# Patient Record
Sex: FEMALE | Race: WHITE | Employment: OTHER | URBAN - METROPOLITAN AREA
[De-identification: names, ages, dates, MRNs, and addresses within clinical notes are randomized per-mention and may not be internally consistent; named-entity substitution may affect disease eponyms.]

---

## 2017-01-05 ENCOUNTER — OFFICE VISIT (OUTPATIENT)
Dept: INTERNAL MEDICINE CLINIC | Facility: CLINIC | Age: 52
End: 2017-01-05

## 2017-01-05 ENCOUNTER — HOSPITAL ENCOUNTER (OUTPATIENT)
Dept: GENERAL RADIOLOGY | Facility: HOSPITAL | Age: 52
Discharge: HOME | End: 2017-01-05
Attending: INTERNAL MEDICINE
Payer: MEDICAID

## 2017-01-05 VITALS
SYSTOLIC BLOOD PRESSURE: 138 MMHG | BODY MASS INDEX: 32.65 KG/M2 | HEART RATE: 76 BPM | WEIGHT: 196 LBS | RESPIRATION RATE: 16 BRPM | DIASTOLIC BLOOD PRESSURE: 83 MMHG | HEIGHT: 65 IN

## 2017-01-05 DIAGNOSIS — M54.50 ACUTE MIDLINE LOW BACK PAIN WITHOUT SCIATICA: ICD-10-CM

## 2017-01-05 DIAGNOSIS — M54.50 ACUTE MIDLINE LOW BACK PAIN WITHOUT SCIATICA: Primary | ICD-10-CM

## 2017-01-05 PROCEDURE — 72110 X-RAY EXAM L-2 SPINE 4/>VWS: CPT

## 2017-01-05 PROCEDURE — 99214 OFFICE O/P EST MOD 30 MIN: CPT | Performed by: INTERNAL MEDICINE

## 2017-01-05 PROCEDURE — 99212 OFFICE O/P EST SF 10 MIN: CPT | Performed by: INTERNAL MEDICINE

## 2017-01-05 RX ORDER — TRAMADOL HYDROCHLORIDE 50 MG/1
50 TABLET ORAL EVERY 6 HOURS PRN
Qty: 30 TABLET | Refills: 1 | Status: SHIPPED | OUTPATIENT
Start: 2017-01-05 | End: 2018-08-30

## 2017-01-05 NOTE — PROGRESS NOTES
Laurel Padilla is a 46year old female. HPI:   1. Acute midline low back pain without sciatica    Hurt back on November 22 after she fell on cement returning home.  Pain was intense and she bruised the following days with difficulty getting up and otherwise  SKIN: denies any unusual skin lesions or rashes  RESPIRATORY: denies shortness of breath with exertion  CARDIOVASCULAR: denies chest pain on exertion  GI: denies abdominal pain and denies heartburn  NEURO: denies headaches    EXAM:   /83 m to stop smoking. The patient indicates understanding of these issues and agrees to the plan.     The patient is asked to return in 3 months

## 2017-01-06 ENCOUNTER — CHARTING TRANS (OUTPATIENT)
Dept: OTHER | Age: 52
End: 2017-01-06

## 2017-01-16 ENCOUNTER — OFFICE VISIT (OUTPATIENT)
Dept: INTERNAL MEDICINE CLINIC | Facility: CLINIC | Age: 52
End: 2017-01-16

## 2017-01-16 VITALS
SYSTOLIC BLOOD PRESSURE: 134 MMHG | HEART RATE: 64 BPM | BODY MASS INDEX: 33.15 KG/M2 | WEIGHT: 199 LBS | DIASTOLIC BLOOD PRESSURE: 85 MMHG | HEIGHT: 65 IN | RESPIRATION RATE: 16 BRPM

## 2017-01-16 DIAGNOSIS — M54.50 ACUTE MIDLINE LOW BACK PAIN WITHOUT SCIATICA: ICD-10-CM

## 2017-01-16 DIAGNOSIS — F17.200 SMOKING ADDICTION: ICD-10-CM

## 2017-01-16 DIAGNOSIS — I70.0 ATHEROSCLEROSIS OF AORTA (HCC): Primary | ICD-10-CM

## 2017-01-16 DIAGNOSIS — R74.8 ELEVATED LIVER ENZYMES: ICD-10-CM

## 2017-01-16 PROCEDURE — 99214 OFFICE O/P EST MOD 30 MIN: CPT | Performed by: INTERNAL MEDICINE

## 2017-01-16 PROCEDURE — 99212 OFFICE O/P EST SF 10 MIN: CPT | Performed by: INTERNAL MEDICINE

## 2017-01-16 NOTE — PROGRESS NOTES
Saige Damon is a 46year old female. HPI:   1. Atherosclerosis of aorta (HCC)    Has atherosclerosis of aorta seen on LS spine films. Has been on cholesterol pills but LDL still over 130 range.      2. Acute midline low back pain without sciati carpal Abuttment\"   • Smoking addiction    • Lipid screening 10-     per [employer]      Social History:    Smoking Status: Smoker, Current Status Unknown  Packs/Day: 0.00  Years: 20        Types: Cigarettes    Alcohol Use: No                 REVIEW O increases ALL cancers and shortens lifespan. Told patient that various medicines are available that can be prescribed to help them stop smoking including nicotine patches, zyban, and chantix.  Electronic cigarettes can help decrease nicotine intake and make

## 2017-02-24 RX ORDER — ATORVASTATIN CALCIUM 80 MG/1
TABLET, FILM COATED ORAL
Qty: 30 TABLET | Refills: 0 | Status: SHIPPED | OUTPATIENT
Start: 2017-02-24 | End: 2017-03-22

## 2017-02-27 ENCOUNTER — TELEPHONE (OUTPATIENT)
Dept: INTERNAL MEDICINE CLINIC | Facility: CLINIC | Age: 52
End: 2017-02-27

## 2017-03-09 RX ORDER — DICLOFENAC SODIUM 75 MG/1
TABLET, DELAYED RELEASE ORAL
Qty: 180 TABLET | Refills: 0 | Status: SHIPPED | OUTPATIENT
Start: 2017-03-09 | End: 2017-06-04

## 2017-03-09 NOTE — TELEPHONE ENCOUNTER
Refill Protocol Appointment Criteria  · Appointment scheduled in the past 6 months or in the next 3 months  Recent Visits       Provider Department Primary Dx    1 month ago Maryse Hua MD [facility], [address] [place] [facility] of

## 2017-03-09 NOTE — TELEPHONE ENCOUNTER
Message was left on voice mail that she can have the pneumonia 23 vaccine and then she can have the prenvair 13. NAVI shipman assist pt with scheduling a nurse visit for the pneumonia vaccine. Thank you.

## 2017-03-14 ENCOUNTER — NURSE ONLY (OUTPATIENT)
Dept: INTERNAL MEDICINE CLINIC | Facility: CLINIC | Age: 52
End: 2017-03-14

## 2017-03-14 DIAGNOSIS — Z23 IMMUNIZATION DUE: Primary | ICD-10-CM

## 2017-03-14 PROCEDURE — 90732 PPSV23 VACC 2 YRS+ SUBQ/IM: CPT | Performed by: INTERNAL MEDICINE

## 2017-03-14 PROCEDURE — 90471 IMMUNIZATION ADMIN: CPT | Performed by: INTERNAL MEDICINE

## 2017-03-14 NOTE — PROGRESS NOTES
Here for pneum. 23 vaccine name and date of birth confirmed. Vaccine given in left deltoid patient tolerated it well.

## 2017-03-24 RX ORDER — ATORVASTATIN CALCIUM 80 MG/1
TABLET, FILM COATED ORAL
Qty: 30 TABLET | Refills: 0 | Status: SHIPPED | OUTPATIENT
Start: 2017-03-24 | End: 2017-04-23

## 2017-04-24 RX ORDER — ATORVASTATIN CALCIUM 80 MG/1
TABLET, FILM COATED ORAL
Qty: 30 TABLET | Refills: 0 | Status: SHIPPED | OUTPATIENT
Start: 2017-04-24 | End: 2017-05-22

## 2017-05-05 ENCOUNTER — TELEPHONE (OUTPATIENT)
Dept: INTERNAL MEDICINE CLINIC | Facility: CLINIC | Age: 52
End: 2017-05-05

## 2017-05-05 NOTE — TELEPHONE ENCOUNTER
Arnaldo calling for refill request on med below     gabapentin 400 MG Oral Cap 360 capsule 0 12/9/2016       Sig :  TAKE ONE CAPSULE BY MOUTH FOUR TIMES DAILY       Route:   (none)       Comment:   **Patient requests 90 days supply**       Order #:   [id number]

## 2017-05-13 RX ORDER — GABAPENTIN 400 MG/1
CAPSULE ORAL
Qty: 360 CAPSULE | Refills: 0 | Status: SHIPPED | OUTPATIENT
Start: 2017-05-13 | End: 2018-04-10

## 2017-05-13 NOTE — TELEPHONE ENCOUNTER
Refill request for Gabapentin 400 mg four times daily, quantity of 360 tablets with 0 refills , was authorized by Dr. Catalina Vital on 05/13/17. No further action is needed.

## 2017-05-13 NOTE — TELEPHONE ENCOUNTER
Refill Protocol Appointment Criteria: Refilled per protocol    · Appointment scheduled in the past 6 months or in the next 3 months  Recent Visits       Provider Department Primary Dx    3 months ago Ziggy Clarke, Merrill Ordaz MD [facility], [facility], [address] [facility]

## 2017-05-17 RX ORDER — GABAPENTIN 400 MG/1
CAPSULE ORAL
Qty: 360 CAPSULE | Refills: 0 | OUTPATIENT
Start: 2017-05-17

## 2017-05-22 NOTE — TELEPHONE ENCOUNTER
Pt is calling to follow up on refill request  Pt states [place] is leaving to [place], and needs 90 day supply please. Please advise.

## 2017-05-24 RX ORDER — ATORVASTATIN CALCIUM 80 MG/1
TABLET, FILM COATED ORAL
Qty: 30 TABLET | Refills: 0 | Status: SHIPPED | OUTPATIENT
Start: 2017-05-24 | End: 2017-10-26

## 2017-06-04 RX ORDER — DICLOFENAC SODIUM 75 MG/1
TABLET, DELAYED RELEASE ORAL
Qty: 180 TABLET | Refills: 0 | Status: SHIPPED | OUTPATIENT
Start: 2017-06-04 | End: 2017-09-05

## 2017-06-04 NOTE — TELEPHONE ENCOUNTER
Last refilled on 3/9/17 #180 0RF  Refilled per protocol    Refill Protocol Appointment Criteria  · Appointment scheduled in the past 6 months or in the next 3 months  Recent Visits       Provider Department Primary Dx    4 months ago Jocelyn Lynch M

## 2017-09-07 RX ORDER — DICLOFENAC SODIUM 75 MG/1
TABLET, DELAYED RELEASE ORAL
Qty: 180 TABLET | Refills: 0 | Status: SHIPPED | OUTPATIENT
Start: 2017-09-07 | End: 2017-12-12

## 2017-09-07 NOTE — TELEPHONE ENCOUNTER
Last refilled on 6-4-17 #90 0RF  Failed protocol. Please advise.   Refill Protocol Appointment Criteria  · Appointment scheduled in the past 6 months or in the next 3 months  Recent Outpatient Visits            5 months ago Immunization due    [facility]

## 2017-09-15 NOTE — TELEPHONE ENCOUNTER
Compliance? ? Last refilled 5-24-17 #30 0RF    Cholesterol Medications  Protocol Criteria:  · Appointment scheduled in the past 12 months or in the next 3 months  · ALT & LDL on file in the past 12 months  · ALT result < 80  · LDL result <130   Recent Outpa

## 2017-09-18 RX ORDER — ATORVASTATIN CALCIUM 80 MG/1
TABLET, FILM COATED ORAL
Qty: 30 TABLET | Refills: 0 | Status: SHIPPED | OUTPATIENT
Start: 2017-09-18 | End: 2017-10-24

## 2017-10-26 RX ORDER — ATORVASTATIN CALCIUM 80 MG/1
TABLET, FILM COATED ORAL
Qty: 30 TABLET | Refills: 0 | Status: SHIPPED | OUTPATIENT
Start: 2017-10-26 | End: 2017-11-28

## 2017-11-29 RX ORDER — ATORVASTATIN CALCIUM 80 MG/1
TABLET, FILM COATED ORAL
Qty: 30 TABLET | Refills: 0 | Status: SHIPPED | OUTPATIENT
Start: 2017-11-29 | End: 2017-12-30

## 2017-12-08 ENCOUNTER — CHARTING TRANS (OUTPATIENT)
Dept: OTHER | Age: 52
End: 2017-12-08

## 2017-12-12 RX ORDER — DICLOFENAC SODIUM 75 MG/1
TABLET, DELAYED RELEASE ORAL
Qty: 180 TABLET | Refills: 0 | Status: SHIPPED | OUTPATIENT
Start: 2017-12-12 | End: 2018-02-15

## 2017-12-12 NOTE — TELEPHONE ENCOUNTER
Protocol failed, please advise on prescription request    Refill Protocol Appointment Criteria  · Appointment scheduled in the past 6 months or in the next 3 months  Recent Outpatient Visits            9 months ago Immunization due    [facility]

## 2018-01-03 RX ORDER — ATORVASTATIN CALCIUM 80 MG/1
TABLET, FILM COATED ORAL
Qty: 30 TABLET | Refills: 0 | Status: SHIPPED | OUTPATIENT
Start: 2018-01-03 | End: 2018-03-12

## 2018-02-13 ENCOUNTER — APPOINTMENT (OUTPATIENT)
Dept: GENERAL RADIOLOGY | Facility: HOSPITAL | Age: 53
End: 2018-02-13
Attending: EMERGENCY MEDICINE

## 2018-02-13 ENCOUNTER — HOSPITAL ENCOUNTER (EMERGENCY)
Facility: HOSPITAL | Age: 53
Discharge: HOME | End: 2018-02-13
Attending: EMERGENCY MEDICINE

## 2018-02-13 VITALS
WEIGHT: 200 LBS | DIASTOLIC BLOOD PRESSURE: 53 MMHG | HEART RATE: 56 BPM | OXYGEN SATURATION: 97 % | TEMPERATURE: 98 F | BODY MASS INDEX: 33.32 KG/M2 | RESPIRATION RATE: 20 BRPM | SYSTOLIC BLOOD PRESSURE: 119 MMHG | HEIGHT: 65 IN

## 2018-02-13 DIAGNOSIS — M25.562 ACUTE PAIN OF LEFT KNEE: Primary | ICD-10-CM

## 2018-02-13 DIAGNOSIS — T24.222A PARTIAL THICKNESS BURN OF LEFT KNEE, INITIAL ENCOUNTER: ICD-10-CM

## 2018-02-13 PROCEDURE — 99283 EMERGENCY DEPT VISIT LOW MDM: CPT

## 2018-02-13 PROCEDURE — 73560 X-RAY EXAM OF KNEE 1 OR 2: CPT | Performed by: EMERGENCY MEDICINE

## 2018-02-13 RX ORDER — IBUPROFEN 600 MG/1
600 TABLET ORAL EVERY 8 HOURS PRN
Qty: 20 TABLET | Refills: 0 | Status: SHIPPED | OUTPATIENT
Start: 2018-02-13 | End: 2018-02-22

## 2018-02-13 RX ORDER — HYDROCODONE BITARTRATE AND ACETAMINOPHEN 5; 325 MG/1; MG/1
1 TABLET ORAL EVERY 4 HOURS PRN
Qty: 15 TABLET | Refills: 0 | Status: SHIPPED | OUTPATIENT
Start: 2018-02-13 | End: 2018-02-22

## 2018-02-13 RX ORDER — HYDROCODONE BITARTRATE AND ACETAMINOPHEN 5; 325 MG/1; MG/1
1 TABLET ORAL ONCE
Status: COMPLETED | OUTPATIENT
Start: 2018-02-13 | End: 2018-02-13

## 2018-02-13 NOTE — ED PROVIDER NOTES
Patient Seen in: [facility] AND [facility] Emergency Department    History   Patient presents with:  Knee Pain    Stated Complaint: Left Knee Pain / swelling     HPI    78-year-old female with history of hyperlipidemia, obesity, here with complaints of left knee Cardiovascular: Negative for chest pain. Gastrointestinal: Negative for abdominal pain, diarrhea, nausea and vomiting. Genitourinary: Negative for dysuria, flank pain and frequency. Musculoskeletal: Positive for arthralgias and joint swelling.  Christopher Mejias and time. 5/5 strength in b/l UEs and LEs, normal sensation in all extremities, normal finger to nose b/l, normal gait, no facial asymmetry, normal speech     Skin: Skin is warm and dry. No rash noted. She is not diaphoretic.    Psychiatric: She has a nor including fevers, redness, purulent drainage, pt expresses understanding and agrees to d/c instructions    EMERGENCY DEPARTMENT MEDICAL DECISION MAKING:  After obtaining the patient's history, performing the physical exam and reviewing the diagnostics, mul

## 2018-02-13 NOTE — ED INITIAL ASSESSMENT (HPI)
\"Sharp\" left knee pain x2 days, worsened with weight bearing. Denies any recent injury. + blisters and redness \"after icing it\".

## 2018-02-14 RX ORDER — DICLOFENAC SODIUM 75 MG/1
TABLET, DELAYED RELEASE ORAL
Qty: 180 TABLET | Refills: 0 | Status: CANCELLED | OUTPATIENT
Start: 2018-02-14

## 2018-02-15 ENCOUNTER — OFFICE VISIT (OUTPATIENT)
Dept: INTERNAL MEDICINE CLINIC | Facility: CLINIC | Age: 53
End: 2018-02-15

## 2018-02-15 VITALS
BODY MASS INDEX: 36.15 KG/M2 | HEART RATE: 76 BPM | SYSTOLIC BLOOD PRESSURE: 138 MMHG | HEIGHT: 65 IN | WEIGHT: 217 LBS | DIASTOLIC BLOOD PRESSURE: 88 MMHG | RESPIRATION RATE: 16 BRPM

## 2018-02-15 DIAGNOSIS — F32.0 MILD SINGLE CURRENT EPISODE OF MAJOR DEPRESSIVE DISORDER (HCC): ICD-10-CM

## 2018-02-15 DIAGNOSIS — E66.01 OBESITY, MORBID (HCC): ICD-10-CM

## 2018-02-15 DIAGNOSIS — T24.102S: Primary | ICD-10-CM

## 2018-02-15 DIAGNOSIS — E78.2 MIXED HYPERLIPIDEMIA: ICD-10-CM

## 2018-02-15 PROCEDURE — 99214 OFFICE O/P EST MOD 30 MIN: CPT | Performed by: INTERNAL MEDICINE

## 2018-02-15 PROCEDURE — 99212 OFFICE O/P EST SF 10 MIN: CPT | Performed by: INTERNAL MEDICINE
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
## 2018-02-16 PROBLEM — E66.01 OBESITY, MORBID (HCC): Status: ACTIVE | Noted: 2018-02-16

## 2018-02-16 PROBLEM — F32.0 MILD SINGLE CURRENT EPISODE OF MAJOR DEPRESSIVE DISORDER (HCC): Status: ACTIVE | Noted: 2018-02-16

## 2018-02-16 PROBLEM — F32.0 MILD SINGLE CURRENT EPISODE OF MAJOR DEPRESSIVE DISORDER: Status: ACTIVE | Noted: 2018-02-16

## 2018-02-16 RX ORDER — DICLOFENAC SODIUM 75 MG/1
TABLET, DELAYED RELEASE ORAL
Qty: 180 TABLET | Refills: 1 | Status: SHIPPED | OUTPATIENT
Start: 2018-02-16 | End: 2018-08-28

## 2018-02-20 ENCOUNTER — TELEPHONE (OUTPATIENT)
Dept: OTHER | Age: 53
End: 2018-02-20

## 2018-02-20 DIAGNOSIS — M25.569 ACUTE KNEE PAIN, UNSPECIFIED LATERALITY: Primary | ICD-10-CM
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
## 2018-02-20 NOTE — TELEPHONE ENCOUNTER
[address] [facility], If patient returns call please let her know that her referrl has been generated to ortho

## 2018-02-20 NOTE — TELEPHONE ENCOUNTER
Spoke with patient and relayed PVR message below--patient verbalizes understanding and agreement. Ortho # given to patient to call for appt. No further questions/concerns at this time.

## 2018-02-22 NOTE — TELEPHONE ENCOUNTER
Please advise on refill request. Ibuprofen and Norco originally prescribed by Dr Elisa Carbone.  Polyethylene glycol not on med list.    Please respond to pool: EM [facility] LPN/CMA    Refill Protocol Appointment Criteria  · Appointment scheduled in the past 6 months o

## 2018-02-23 RX ORDER — HYDROCODONE BITARTRATE AND ACETAMINOPHEN 5; 325 MG/1; MG/1
1 TABLET ORAL EVERY 4 HOURS PRN
Qty: 15 TABLET | Refills: 0 | Status: SHIPPED | OUTPATIENT
Start: 2018-02-23 | End: 2018-03-02

## 2018-02-23 RX ORDER — IBUPROFEN 600 MG/1
600 TABLET ORAL EVERY 8 HOURS PRN
Qty: 20 TABLET | Refills: 0 | Status: SHIPPED | OUTPATIENT
Start: 2018-02-23 | End: 2018-02-28

## 2018-02-23 RX ORDER — POLYETHYLENE GLYCOL 3350
GRANULES (GRAM) MISCELLANEOUS
Qty: 1 BOTTLE | Refills: 1 | Status: SHIPPED | OUTPATIENT
Start: 2018-02-23 | End: 2018-05-11

## 2018-02-26 ENCOUNTER — OFFICE VISIT (OUTPATIENT)
Dept: ORTHOPEDICS CLINIC | Facility: CLINIC | Age: 53
End: 2018-02-26

## 2018-02-26 VITALS — DIASTOLIC BLOOD PRESSURE: 80 MMHG | SYSTOLIC BLOOD PRESSURE: 120 MMHG | RESPIRATION RATE: 18 BRPM | HEART RATE: 60 BPM

## 2018-02-26 DIAGNOSIS — M17.12 PRIMARY OSTEOARTHRITIS OF LEFT KNEE: Primary | ICD-10-CM

## 2018-02-26 DIAGNOSIS — M22.42 CHONDROMALACIA OF LEFT PATELLA: ICD-10-CM

## 2018-02-26 PROCEDURE — 99203 OFFICE O/P NEW LOW 30 MIN: CPT | Performed by: ORTHOPAEDIC SURGERY

## 2018-02-26 PROCEDURE — 20610 DRAIN/INJ JOINT/BURSA W/O US: CPT | Performed by: ORTHOPAEDIC SURGERY

## 2018-02-26 PROCEDURE — 99212 OFFICE O/P EST SF 10 MIN: CPT | Performed by: ORTHOPAEDIC SURGERY

## 2018-02-26 RX ORDER — BETAMETHASONE SODIUM PHOSPHATE AND BETAMETHASONE ACETATE 3; 3 MG/ML; MG/ML
12 INJECTION, SUSPENSION INTRA-ARTICULAR; INTRALESIONAL; INTRAMUSCULAR; SOFT TISSUE ONCE
Status: COMPLETED | OUTPATIENT
Start: 2018-02-26 | End: 2018-02-26

## 2018-02-26 RX ADMIN — BETAMETHASONE SODIUM PHOSPHATE AND BETAMETHASONE ACETATE 12 MG: 3; 3 INJECTION, SUSPENSION INTRA-ARTICULAR; INTRALESIONAL; INTRAMUSCULAR; SOFT TISSUE at 11:06:00

## 2018-02-26 NOTE — PATIENT INSTRUCTIONS
CORTISONE INJECTIONS  WHAT IS IT? Cortisone is the strongest anti-inflammatory agent available. It is used to decrease pain and reduce localized swelling.    This is similar to taking an anti-inflammatory by mouth (Advil or aspirin) and injecting it loc

## 2018-02-26 NOTE — PROCEDURES
Procedure note-with consent given and a timeout performed the left knee was injected 2 cc Celestone 4 cc Xylocaine under sterile technique. She tolerated the injection well education material regarding injections were discussed and provided.

## 2018-02-26 NOTE — PROGRESS NOTES
Patient is a 49-year-old female who does not describe a fall or trauma but was driving and felt a cracking-like sensation in her knee and had sudden pain.   She does not describe a fall twist or turn she did have some complaints for a week or 2 before that in the legs she denies constitutional symptoms    She used to work as a  she states presently she is not working    On physical exam she is alert oriented well-groomed oriented ×3 she does not walk with an antalgic gait.   She has a height of 5 feet

## 2018-02-26 NOTE — PROGRESS NOTES
Prepared as ordered by Dr. Leatha Cherry. 4 cc's of 1 % lidocaine and 2 cc's of celestone. Pt given steroid information sheet. Pre injection vs stable. Time out  Progressed and consent signed by Marianne Huston , myself and Dr. Leatha Cherry for left knee steroid injection.  Med

## 2018-02-28 RX ORDER — IBUPROFEN 600 MG/1
TABLET ORAL
Qty: 20 TABLET | Refills: 0 | Status: SHIPPED | OUTPATIENT
Start: 2018-02-28 | End: 2018-03-04

## 2018-03-05 RX ORDER — IBUPROFEN 600 MG/1
TABLET ORAL
Qty: 20 TABLET | Refills: 0 | Status: SHIPPED | OUTPATIENT
Start: 2018-03-05 | End: 2018-03-14

## 2018-03-06 ENCOUNTER — OFFICE VISIT (OUTPATIENT)
Dept: PHYSICAL THERAPY | Facility: HOSPITAL | Age: 53
End: 2018-03-06
Attending: ORTHOPAEDIC SURGERY
Payer: MEDICAID

## 2018-03-06 DIAGNOSIS — M17.12 PRIMARY OSTEOARTHRITIS OF LEFT KNEE: ICD-10-CM

## 2018-03-06 DIAGNOSIS — M22.42 CHONDROMALACIA OF LEFT PATELLA: ICD-10-CM

## 2018-03-06 PROCEDURE — 97162 PT EVAL MOD COMPLEX 30 MIN: CPT | Performed by: PHYSICAL THERAPIST

## 2018-03-06 NOTE — PROGRESS NOTES
KNEE EVALUATION:   Referring Physician: Dr. Harriet Mg  Diagnosis: Primary osteoarthritis of left knee (M17.12)  Chondromalacia of left patella (M22.42)      Onset Date: Feb 2018 Evaluation Date: 3/6/2018  Visit # 1  Scheduled Visits 8  Insurance Authorized gait ,step too gait on stairs, decreased L-spine AROM and requires B UE support with sit to stand transfers. Jane Merino would benefit from skilled Physical Therapy for the knee and the L-spine to address the above impairments to walk and perform stairs.     P Patient will be seen for 2 x/week or a total of 10 visits over a 90 day period. Treatment will include: Manual Therapy; Therapeutic Exercises; Neuromuscular Re-education;  Therapeutic Activity; Gait Training; Patient education; Home Exercise Program; modal

## 2018-03-08 ENCOUNTER — OFFICE VISIT (OUTPATIENT)
Dept: PHYSICAL THERAPY | Facility: HOSPITAL | Age: 53
End: 2018-03-08
Attending: ORTHOPAEDIC SURGERY
Payer: MEDICAID

## 2018-03-08 DIAGNOSIS — M22.42 CHONDROMALACIA OF LEFT PATELLA: ICD-10-CM

## 2018-03-08 DIAGNOSIS — M17.12 PRIMARY OSTEOARTHRITIS OF LEFT KNEE: ICD-10-CM

## 2018-03-08 PROCEDURE — 97110 THERAPEUTIC EXERCISES: CPT | Performed by: PHYSICAL THERAPIST

## 2018-03-08 NOTE — PROGRESS NOTES
Dx: Primary osteoarthritis of left knee (M17.12)  Chondromalacia of left patella (M22.42)             Visit # 2  Fall Risk: standard     Scheduled Visits 8  Precautions: n/a   Insurance Authorized visits  10 medicaid        Next MD visit: none scheduled  E

## 2018-03-12 ENCOUNTER — OFFICE VISIT (OUTPATIENT)
Dept: PHYSICAL THERAPY | Facility: HOSPITAL | Age: 53
End: 2018-03-12
Attending: ORTHOPAEDIC SURGERY
Payer: MEDICAID

## 2018-03-12 DIAGNOSIS — M17.12 PRIMARY OSTEOARTHRITIS OF LEFT KNEE: ICD-10-CM

## 2018-03-12 DIAGNOSIS — M22.42 CHONDROMALACIA OF LEFT PATELLA: ICD-10-CM

## 2018-03-12 PROCEDURE — 97110 THERAPEUTIC EXERCISES: CPT

## 2018-03-12 PROCEDURE — 97014 ELECTRIC STIMULATION THERAPY: CPT

## 2018-03-12 RX ORDER — ATORVASTATIN CALCIUM 80 MG/1
TABLET, FILM COATED ORAL
Qty: 30 TABLET | Refills: 0 | Status: SHIPPED | OUTPATIENT
Start: 2018-03-12 | End: 2018-04-08

## 2018-03-12 NOTE — PROGRESS NOTES
Dx: Primary osteoarthritis of left knee (M17.12)  Chondromalacia of left patella (M22.42)             Visit # 3/10  Fall Risk: standard     Scheduled Visits 8  Precautions: n/a   Insurance Authorized visits  10 medicaid        Next MD visit: none scheduled

## 2018-03-14 RX ORDER — IBUPROFEN 600 MG/1
TABLET ORAL
Qty: 20 TABLET | Refills: 0 | Status: SHIPPED | OUTPATIENT
Start: 2018-03-14 | End: 2018-03-18

## 2018-03-15 ENCOUNTER — OFFICE VISIT (OUTPATIENT)
Dept: PHYSICAL THERAPY | Facility: HOSPITAL | Age: 53
End: 2018-03-15
Attending: ORTHOPAEDIC SURGERY
Payer: MEDICAID

## 2018-03-15 DIAGNOSIS — M22.42 CHONDROMALACIA OF LEFT PATELLA: ICD-10-CM

## 2018-03-15 DIAGNOSIS — M17.12 PRIMARY OSTEOARTHRITIS OF LEFT KNEE: ICD-10-CM

## 2018-03-15 PROCEDURE — 97110 THERAPEUTIC EXERCISES: CPT

## 2018-03-18 RX ORDER — IBUPROFEN 600 MG/1
TABLET ORAL
Qty: 20 TABLET | Refills: 0 | Status: SHIPPED | OUTPATIENT
Start: 2018-03-18 | End: 2018-04-08

## 2018-03-20 ENCOUNTER — OFFICE VISIT (OUTPATIENT)
Dept: PHYSICAL THERAPY | Facility: HOSPITAL | Age: 53
End: 2018-03-20
Attending: ORTHOPAEDIC SURGERY
Payer: MEDICAID

## 2018-03-20 DIAGNOSIS — M17.12 PRIMARY OSTEOARTHRITIS OF LEFT KNEE: ICD-10-CM

## 2018-03-20 DIAGNOSIS — M22.42 CHONDROMALACIA OF LEFT PATELLA: ICD-10-CM

## 2018-03-20 PROCEDURE — 97110 THERAPEUTIC EXERCISES: CPT

## 2018-03-20 PROCEDURE — 97014 ELECTRIC STIMULATION THERAPY: CPT

## 2018-03-20 NOTE — PROGRESS NOTES
Dx: Primary osteoarthritis of left knee (M17.12)  Chondromalacia of left patella (M22.42)             Visit # 4/10  Fall Risk: standard     Scheduled Visits 8  Precautions: n/a   Insurance Authorized visits  10 medicaid        Next MD visit: none scheduled

## 2018-03-22 ENCOUNTER — OFFICE VISIT (OUTPATIENT)
Dept: PHYSICAL THERAPY | Facility: HOSPITAL | Age: 53
End: 2018-03-22
Attending: ORTHOPAEDIC SURGERY
Payer: MEDICAID

## 2018-03-22 DIAGNOSIS — M22.42 CHONDROMALACIA OF LEFT PATELLA: ICD-10-CM

## 2018-03-22 DIAGNOSIS — M17.12 PRIMARY OSTEOARTHRITIS OF LEFT KNEE: ICD-10-CM

## 2018-03-22 PROCEDURE — 97014 ELECTRIC STIMULATION THERAPY: CPT

## 2018-03-22 PROCEDURE — 97110 THERAPEUTIC EXERCISES: CPT

## 2018-03-22 NOTE — PROGRESS NOTES
Dx: Primary osteoarthritis of left knee (M17.12)  Chondromalacia of left patella (M22.42)             Visit # 5/10  Fall Risk: standard     Scheduled Visits 8  Precautions: n/a   Insurance Authorized visits  10 medicaid        Next MD visit: none scheduled

## 2018-03-27 ENCOUNTER — OFFICE VISIT (OUTPATIENT)
Dept: PHYSICAL THERAPY | Facility: HOSPITAL | Age: 53
End: 2018-03-27
Attending: ORTHOPAEDIC SURGERY
Payer: MEDICAID

## 2018-03-27 DIAGNOSIS — M22.42 CHONDROMALACIA OF LEFT PATELLA: ICD-10-CM

## 2018-03-27 DIAGNOSIS — M17.12 PRIMARY OSTEOARTHRITIS OF LEFT KNEE: ICD-10-CM

## 2018-03-27 PROCEDURE — 97140 MANUAL THERAPY 1/> REGIONS: CPT

## 2018-03-27 PROCEDURE — 97110 THERAPEUTIC EXERCISES: CPT

## 2018-03-27 NOTE — PROGRESS NOTES
Dx: Primary osteoarthritis of left knee (M17.12)  Chondromalacia of left patella (M22.42)             Visit # 5/10  Fall Risk: standard     Scheduled Visits 8  Precautions: n/a   Insurance Authorized visits  10 medicaid        Next MD visit: none scheduled aggravated with ligamentous testing. Responded well to manual therapy to SIJ, TL junction, L/S with N glides decreasing pain by 1/3. HEP:  Femoral N glides, TA bracing.     Plan: Cont PT to address adverse neural tension femoral N and core stabilizati

## 2018-03-29 ENCOUNTER — OFFICE VISIT (OUTPATIENT)
Dept: PHYSICAL THERAPY | Facility: HOSPITAL | Age: 53
End: 2018-03-29
Attending: ORTHOPAEDIC SURGERY
Payer: MEDICAID

## 2018-03-29 DIAGNOSIS — M17.12 PRIMARY OSTEOARTHRITIS OF LEFT KNEE: ICD-10-CM

## 2018-03-29 DIAGNOSIS — M22.42 CHONDROMALACIA OF LEFT PATELLA: ICD-10-CM

## 2018-03-29 PROCEDURE — 97140 MANUAL THERAPY 1/> REGIONS: CPT

## 2018-03-29 PROCEDURE — 97110 THERAPEUTIC EXERCISES: CPT

## 2018-04-02 ENCOUNTER — NURSE TRIAGE (OUTPATIENT)
Dept: INTERNAL MEDICINE CLINIC | Facility: CLINIC | Age: 53
End: 2018-04-02

## 2018-04-02 ENCOUNTER — OFFICE VISIT (OUTPATIENT)
Dept: INTERNAL MEDICINE CLINIC | Facility: CLINIC | Age: 53
End: 2018-04-02

## 2018-04-02 VITALS
DIASTOLIC BLOOD PRESSURE: 78 MMHG | WEIGHT: 216 LBS | SYSTOLIC BLOOD PRESSURE: 124 MMHG | HEART RATE: 77 BPM | BODY MASS INDEX: 35.99 KG/M2 | RESPIRATION RATE: 16 BRPM | HEIGHT: 65 IN

## 2018-04-02 DIAGNOSIS — G89.29 CHRONIC PAIN OF LEFT KNEE: Primary | ICD-10-CM

## 2018-04-02 DIAGNOSIS — F32.0 MILD SINGLE CURRENT EPISODE OF MAJOR DEPRESSIVE DISORDER (HCC): ICD-10-CM

## 2018-04-02 DIAGNOSIS — M25.562 CHRONIC PAIN OF LEFT KNEE: Primary | ICD-10-CM

## 2018-04-02 DIAGNOSIS — E66.01 OBESITY, MORBID (HCC): ICD-10-CM

## 2018-04-02 PROCEDURE — 99214 OFFICE O/P EST MOD 30 MIN: CPT | Performed by: INTERNAL MEDICINE

## 2018-04-02 PROCEDURE — 99212 OFFICE O/P EST SF 10 MIN: CPT | Performed by: INTERNAL MEDICINE

## 2018-04-02 NOTE — TELEPHONE ENCOUNTER
Spoke with patient (name and  verified), reviewed information, patient verbalized understanding and agrees with plan.  appt made [phone] today [facility]

## 2018-04-02 NOTE — TELEPHONE ENCOUNTER
Action Requested: Summary for Provider     []  Critical Lab, Recommendations Needed  [] Need Additional Advice  []   FYI    []   Need Orders  [] Need Medications Sent to Pharmacy  []  Other     SUMMARY: Dr Manuel Jessica, patient reports following therapy 3 day

## 2018-04-02 NOTE — PROGRESS NOTES
Jorden Mir is a 45year old female. HPI:     1. Chronic pain of left knee    Patient developed a sore knee that was painful to walk.  Applied cold ice lance to knee and developed a burn with a fluid collection that formed and enlarged over knee (CYMBALTA) 60 MG Oral Cap DR Particles Take 60 mg by mouth daily. Disp:  Rfl:    Sertraline HCl (ZOLOFT) 25 MG Oral Tab Take 1 tablet by mouth daily.  Disp: 90 tablet Rfl: 1      Past Medical History:   Diagnosis Date   • Hyperlipidemia    • Left wrist frac ibuprofen 800 mg (4 of the 200 mg pills or capsules) every 8 hours with food or tylenol XS (500 mg) up to 4 daily. (1 every 6 hours) for pain pain relief. Glucosamine chondroitin 500/400 can be tried OTC (over the counter) twice daily as well. Use ibuprofen

## 2018-04-03 ENCOUNTER — HOSPITAL ENCOUNTER (OUTPATIENT)
Dept: ULTRASOUND IMAGING | Facility: HOSPITAL | Age: 53
Discharge: HOME | End: 2018-04-03
Attending: ORTHOPAEDIC SURGERY
Payer: MEDICARE

## 2018-04-03 ENCOUNTER — OFFICE VISIT (OUTPATIENT)
Dept: ORTHOPEDICS CLINIC | Facility: CLINIC | Age: 53
End: 2018-04-03

## 2018-04-03 ENCOUNTER — HOSPITAL ENCOUNTER (OUTPATIENT)
Dept: GENERAL RADIOLOGY | Facility: HOSPITAL | Age: 53
Discharge: HOME | End: 2018-04-03
Attending: ORTHOPAEDIC SURGERY
Payer: MEDICARE

## 2018-04-03 DIAGNOSIS — M25.462 BILATERAL KNEE SWELLING: ICD-10-CM

## 2018-04-03 DIAGNOSIS — M25.461 BILATERAL KNEE SWELLING: ICD-10-CM

## 2018-04-03 DIAGNOSIS — M79.661 RIGHT CALF PAIN: ICD-10-CM

## 2018-04-03 DIAGNOSIS — M25.562 ACUTE PAIN OF LEFT KNEE: ICD-10-CM

## 2018-04-03 DIAGNOSIS — M25.561 ACUTE PAIN OF RIGHT KNEE: Primary | ICD-10-CM

## 2018-04-03 PROCEDURE — 99212 OFFICE O/P EST SF 10 MIN: CPT | Performed by: ORTHOPAEDIC SURGERY

## 2018-04-03 PROCEDURE — 93971 EXTREMITY STUDY: CPT | Performed by: ORTHOPAEDIC SURGERY

## 2018-04-03 PROCEDURE — 73565 X-RAY EXAM OF KNEES: CPT | Performed by: ORTHOPAEDIC SURGERY

## 2018-04-03 PROCEDURE — 99214 OFFICE O/P EST MOD 30 MIN: CPT | Performed by: ORTHOPAEDIC SURGERY

## 2018-04-03 PROCEDURE — 73560 X-RAY EXAM OF KNEE 1 OR 2: CPT | Performed by: ORTHOPAEDIC SURGERY

## 2018-04-04 NOTE — PROGRESS NOTES
4/3/2018  Cathie Pino  11/19/1965  45year old   female  Amadeo Walker MD    HPI:   Patient presents with:  Knee Pain: Bilateral - was seen by Dr Laurel Oden and had a cortisone inj in the L on 2/26/18 - she is in PT and yesterday her bilatera unspecified    • Other ill-defined conditions(799.89) 2012    per [employer]:  \"Left wrist/elbow ulnocarpal disease; Management:  Ulno carpal Abuttment\"   • Right ACL tear 2011    per [employer]:  \"Right knee ACL tear\"   • Rotator cuff tear 11/2009    per Ne lines bilaterally. Patient has a positive Paola's test bilaterally. Patient has retropatellar compression pain of the bilateral knees. The patient has medial patella facet tenderness to palpation bilaterally.   Patient has an intact cruciate and colla

## 2018-04-07 NOTE — PROGRESS NOTES
Dx: Primary osteoarthritis of left knee (M17.12)  Chondromalacia of left patella (M22.42)             Visit # 6/10  Fall Risk: standard     Scheduled Visits 8  Precautions: n/a   Insurance Authorized visits  10 medicaid        Next MD visit: none scheduled

## 2018-04-08 RX ORDER — IBUPROFEN 600 MG/1
TABLET ORAL
Qty: 20 TABLET | Refills: 0 | Status: SHIPPED | OUTPATIENT
Start: 2018-04-08 | End: 2018-04-17

## 2018-04-08 RX ORDER — ATORVASTATIN CALCIUM 80 MG/1
TABLET, FILM COATED ORAL
Qty: 30 TABLET | Refills: 0 | Status: SHIPPED | OUTPATIENT
Start: 2018-04-08 | End: 2018-05-07

## 2018-04-10 ENCOUNTER — HOSPITAL ENCOUNTER (OUTPATIENT)
Dept: MRI IMAGING | Facility: HOSPITAL | Age: 53
Discharge: HOME | End: 2018-04-10
Attending: ORTHOPAEDIC SURGERY
Payer: MEDICARE

## 2018-04-10 ENCOUNTER — OFFICE VISIT (OUTPATIENT)
Dept: ORTHOPEDICS CLINIC | Facility: CLINIC | Age: 53
End: 2018-04-10

## 2018-04-10 VITALS — DIASTOLIC BLOOD PRESSURE: 72 MMHG | HEART RATE: 76 BPM | SYSTOLIC BLOOD PRESSURE: 120 MMHG | RESPIRATION RATE: 16 BRPM

## 2018-04-10 DIAGNOSIS — M17.12 PRIMARY OSTEOARTHRITIS OF LEFT KNEE: ICD-10-CM

## 2018-04-10 DIAGNOSIS — M25.561 ACUTE PAIN OF RIGHT KNEE: ICD-10-CM

## 2018-04-10 DIAGNOSIS — M25.562 ACUTE PAIN OF LEFT KNEE: ICD-10-CM

## 2018-04-10 DIAGNOSIS — M23.41 LOOSE BODY IN KNEE, RIGHT KNEE: Primary | ICD-10-CM

## 2018-04-10 PROCEDURE — 99212 OFFICE O/P EST SF 10 MIN: CPT | Performed by: ORTHOPAEDIC SURGERY

## 2018-04-10 PROCEDURE — 73721 MRI JNT OF LWR EXTRE W/O DYE: CPT | Performed by: ORTHOPAEDIC SURGERY

## 2018-04-10 PROCEDURE — 99213 OFFICE O/P EST LOW 20 MIN: CPT | Performed by: ORTHOPAEDIC SURGERY

## 2018-04-10 NOTE — PROGRESS NOTES
Per verbal order from [facility], draw up 4ml of 1% lidocaine and 2ml of Kenalog 10 for cortisone injection to right knee.  Abby Le

## 2018-04-10 NOTE — PROGRESS NOTES
4/10/2018  Salty Milan  11/19/1965  45year old   female  Nirmal Isabel MD    HPI:   Patient presents with:  Knee Pain: Bilateral f/u and MRI results - had MRI done today in the system - still has pain rated as 9/10 at all the time.      Chaka Oviedo cartilage thinning in the medial, lateral, patellofemoral compartments. There are stress changes within the medial compartment without acute fracture.     ASSESSMENT AND PLAN:   Loose body in knee, right knee  (primary encounter diagnosis)  Acute pain of r

## 2018-04-11 ENCOUNTER — APPOINTMENT (OUTPATIENT)
Dept: PHYSICAL THERAPY | Facility: HOSPITAL | Age: 53
End: 2018-04-11
Attending: PODIATRIST
Payer: MEDICAID

## 2018-04-11 RX ORDER — GABAPENTIN 400 MG/1
CAPSULE ORAL
Qty: 360 CAPSULE | Refills: 0 | Status: SHIPPED | OUTPATIENT
Start: 2018-04-11 | End: 2018-08-28

## 2018-04-17 ENCOUNTER — TELEPHONE (OUTPATIENT)
Dept: PHYSICAL THERAPY | Facility: HOSPITAL | Age: 53
End: 2018-04-17

## 2018-04-17 RX ORDER — IBUPROFEN 600 MG/1
TABLET ORAL
Qty: 20 TABLET | Refills: 0 | Status: SHIPPED | OUTPATIENT
Start: 2018-04-17 | End: 2018-04-21

## 2018-04-18 ENCOUNTER — APPOINTMENT (OUTPATIENT)
Dept: PHYSICAL THERAPY | Facility: HOSPITAL | Age: 53
End: 2018-04-18
Attending: PODIATRIST
Payer: MEDICAID

## 2018-04-23 ENCOUNTER — APPOINTMENT (OUTPATIENT)
Dept: PHYSICAL THERAPY | Facility: HOSPITAL | Age: 53
End: 2018-04-23
Attending: PODIATRIST
Payer: MEDICAID

## 2018-04-23 RX ORDER — IBUPROFEN 600 MG/1
TABLET ORAL
Qty: 20 TABLET | Refills: 0 | Status: SHIPPED | OUTPATIENT
Start: 2018-04-23 | End: 2018-04-26

## 2018-04-26 ENCOUNTER — TELEPHONE (OUTPATIENT)
Dept: INTERNAL MEDICINE CLINIC | Facility: CLINIC | Age: 53
End: 2018-04-26

## 2018-04-26 ENCOUNTER — OFFICE VISIT (OUTPATIENT)
Dept: PHYSICAL THERAPY | Facility: HOSPITAL | Age: 53
End: 2018-04-26
Attending: PODIATRIST
Payer: MEDICAID

## 2018-04-26 DIAGNOSIS — M54.50 ACUTE MIDLINE LOW BACK PAIN WITHOUT SCIATICA: Primary | ICD-10-CM

## 2018-04-26 PROCEDURE — 97110 THERAPEUTIC EXERCISES: CPT

## 2018-04-26 PROCEDURE — 97140 MANUAL THERAPY 1/> REGIONS: CPT

## 2018-04-26 RX ORDER — IBUPROFEN 600 MG/1
TABLET ORAL
Qty: 20 TABLET | Refills: 0 | Status: SHIPPED | OUTPATIENT
Start: 2018-04-26 | End: 2018-05-17

## 2018-04-26 NOTE — TELEPHONE ENCOUNTER
Benjy Perez from PT called stating Pt needs updated order for 1/16/2017 acute midline lower back pain. Please advise, thank you.

## 2018-04-26 NOTE — PROGRESS NOTES
Dx: Primary osteoarthritis of left knee (M17.12)  Chondromalacia of left patella (M22.42)             Visit # 9/10  Fall Risk: standard     Scheduled Visits   Precautions: n/a   Insurance Authorized visits  10 medicaid        Next MD visit: none scheduled

## 2018-04-26 NOTE — TELEPHONE ENCOUNTER
Pt wants to know if Dr will be willing to prescribe her a meds to help her with her arthritis pain.     Pt say med is called     [employer]

## 2018-04-30 ENCOUNTER — OFFICE VISIT (OUTPATIENT)
Dept: PHYSICAL THERAPY | Facility: HOSPITAL | Age: 53
End: 2018-04-30
Attending: PODIATRIST
Payer: MEDICAID

## 2018-04-30 PROCEDURE — 97110 THERAPEUTIC EXERCISES: CPT

## 2018-04-30 NOTE — PROGRESS NOTES
DISCHARGE SUMMARY    Dx: Primary osteoarthritis of left knee (M17.12)  Chondromalacia of left patella (M22.42)             Visit # 10/10  Fall Risk: standard     Scheduled Visits   Precautions: n/a   Insurance Authorized visits  10 medicaid        [facility] MD grocery store with <2/10 pain report (NOT MET DUE TO ELEVATED PAIN LEVELS)    Plan: DC with full HEP due to plateau in gains.     Charges: EX 3 Total Timed Treatment: EX 40 min  Total Treatment Time:42 min

## 2018-05-03 ENCOUNTER — APPOINTMENT (OUTPATIENT)
Dept: PHYSICAL THERAPY | Facility: HOSPITAL | Age: 53
End: 2018-05-03
Attending: PODIATRIST
Payer: MEDICARE

## 2018-05-08 ENCOUNTER — APPOINTMENT (OUTPATIENT)
Dept: PHYSICAL THERAPY | Facility: HOSPITAL | Age: 53
End: 2018-05-08
Attending: INTERNAL MEDICINE
Payer: MEDICARE

## 2018-05-09 RX ORDER — ATORVASTATIN CALCIUM 80 MG/1
TABLET, FILM COATED ORAL
Qty: 30 TABLET | Refills: 0 | Status: SHIPPED | OUTPATIENT
Start: 2018-05-09 | End: 2018-06-05

## 2018-05-09 NOTE — TELEPHONE ENCOUNTER
Please let patient know that she is overdue for her blood work and to please get that drawn-will refill, please have her follow-up with her PCP

## 2018-05-12 RX ORDER — POLYETHYLENE GLYCOL 3350 17 G/17G
POWDER, FOR SOLUTION ORAL
Qty: 578 G | Refills: 1 | Status: SHIPPED | OUTPATIENT
Start: 2018-05-12 | End: 2019-04-26

## 2018-05-12 NOTE — TELEPHONE ENCOUNTER
Gastrointestional Medication Protocol Passed5/11 7:45 AM   Appointment in past 12 or next 3 months     Medication refilled for 90 days per protocol.

## 2018-05-15 ENCOUNTER — APPOINTMENT (OUTPATIENT)
Dept: PHYSICAL THERAPY | Facility: HOSPITAL | Age: 53
End: 2018-05-15
Attending: INTERNAL MEDICINE
Payer: MEDICARE

## 2018-05-17 RX ORDER — IBUPROFEN 600 MG/1
TABLET ORAL
Qty: 20 TABLET | Refills: 0 | Status: SHIPPED | OUTPATIENT
Start: 2018-05-17 | End: 2018-05-22

## 2018-05-18 ENCOUNTER — APPOINTMENT (OUTPATIENT)
Dept: PHYSICAL THERAPY | Facility: HOSPITAL | Age: 53
End: 2018-05-18
Attending: INTERNAL MEDICINE
Payer: MEDICARE

## 2018-05-21 ENCOUNTER — APPOINTMENT (OUTPATIENT)
Dept: PHYSICAL THERAPY | Facility: HOSPITAL | Age: 53
End: 2018-05-21
Attending: INTERNAL MEDICINE
Payer: MEDICARE

## 2018-05-22 RX ORDER — IBUPROFEN 600 MG/1
TABLET ORAL
Qty: 20 TABLET | Refills: 0 | Status: SHIPPED | OUTPATIENT
Start: 2018-05-22 | End: 2018-05-31

## 2018-05-22 NOTE — TELEPHONE ENCOUNTER
Refill Protocol Appointment Criteria  · Appointment scheduled in the past 6 months or in the next 3 months  Recent Outpatient Visits            3 weeks ago     [facility] Juan Lama    Office Visit    3 weeks ago     T[facility]

## 2018-05-24 ENCOUNTER — APPOINTMENT (OUTPATIENT)
Dept: PHYSICAL THERAPY | Facility: HOSPITAL | Age: 53
End: 2018-05-24
Attending: INTERNAL MEDICINE
Payer: MEDICARE

## 2018-05-29 ENCOUNTER — APPOINTMENT (OUTPATIENT)
Dept: PHYSICAL THERAPY | Facility: HOSPITAL | Age: 53
End: 2018-05-29
Attending: INTERNAL MEDICINE
Payer: MEDICARE

## 2018-05-31 RX ORDER — IBUPROFEN 600 MG/1
TABLET ORAL
Qty: 257 TABLET | Refills: 0 | OUTPATIENT
Start: 2018-05-31

## 2018-05-31 RX ORDER — IBUPROFEN 600 MG/1
TABLET ORAL
Qty: 20 TABLET | Refills: 0 | Status: SHIPPED | OUTPATIENT
Start: 2018-05-31 | End: 2018-06-05

## 2018-05-31 RX ORDER — ATORVASTATIN CALCIUM 80 MG/1
TABLET, FILM COATED ORAL
Qty: 30 TABLET | Refills: 0 | OUTPATIENT
Start: 2018-05-31

## 2018-05-31 NOTE — TELEPHONE ENCOUNTER
Pharmacy     [address] AT [facility], [phone], [phone]   Medication Detail      Disp Refills Start End    IBUPROFEN 600 MG Oral Tab 20 tablet 0 5/31/2018     Sig: TAKE 1 T

## 2018-06-01 ENCOUNTER — APPOINTMENT (OUTPATIENT)
Dept: PHYSICAL THERAPY | Facility: HOSPITAL | Age: 53
End: 2018-06-01
Attending: INTERNAL MEDICINE
Payer: MEDICARE

## 2018-06-05 RX ORDER — IBUPROFEN 600 MG/1
TABLET ORAL
Qty: 20 TABLET | Refills: 0 | Status: SHIPPED | OUTPATIENT
Start: 2018-06-05 | End: 2018-06-14

## 2018-06-05 RX ORDER — ATORVASTATIN CALCIUM 80 MG/1
TABLET, FILM COATED ORAL
Qty: 30 TABLET | Refills: 5 | Status: SHIPPED | OUTPATIENT
Start: 2018-06-05 | End: 2018-12-02

## 2018-06-14 RX ORDER — IBUPROFEN 600 MG/1
TABLET ORAL
Qty: 20 TABLET | Refills: 0 | Status: SHIPPED | OUTPATIENT
Start: 2018-06-14 | End: 2018-06-17

## 2018-06-19 RX ORDER — IBUPROFEN 600 MG/1
TABLET ORAL
Qty: 20 TABLET | Refills: 0 | Status: SHIPPED | OUTPATIENT
Start: 2018-06-19 | End: 2020-03-30

## 2018-06-19 NOTE — TELEPHONE ENCOUNTER
Non-Narcotic Pain Medication Protocol Passed6/17 8:00 AM   Appointment in past 6 or next 3 months     [address] [address], [address] [facility]  [address] [facility], [phone], [phone]   Medication D

## 2018-06-24 RX ORDER — IBUPROFEN 600 MG/1
TABLET ORAL
Qty: 20 TABLET | Refills: 0 | OUTPATIENT
Start: 2018-06-24

## 2018-08-29 RX ORDER — DICLOFENAC SODIUM 75 MG/1
TABLET, DELAYED RELEASE ORAL
Qty: 180 TABLET | Refills: 0 | Status: SHIPPED | OUTPATIENT
Start: 2018-08-29 | End: 2018-11-25

## 2018-08-29 RX ORDER — GABAPENTIN 400 MG/1
CAPSULE ORAL
Qty: 360 CAPSULE | Refills: 0 | Status: SHIPPED | OUTPATIENT
Start: 2018-08-29 | End: 2018-11-25

## 2018-08-30 ENCOUNTER — OFFICE VISIT (OUTPATIENT)
Dept: INTERNAL MEDICINE CLINIC | Facility: CLINIC | Age: 53
End: 2018-08-30
Payer: MEDICAID

## 2018-08-30 ENCOUNTER — LAB ENCOUNTER (OUTPATIENT)
Dept: LAB | Facility: HOSPITAL | Age: 53
End: 2018-08-30
Attending: INTERNAL MEDICINE
Payer: MEDICARE

## 2018-08-30 VITALS
SYSTOLIC BLOOD PRESSURE: 144 MMHG | BODY MASS INDEX: 34 KG/M2 | WEIGHT: 203 LBS | DIASTOLIC BLOOD PRESSURE: 84 MMHG | RESPIRATION RATE: 16 BRPM | HEART RATE: 76 BPM

## 2018-08-30 DIAGNOSIS — E66.01 OBESITY, MORBID (HCC): ICD-10-CM

## 2018-08-30 DIAGNOSIS — E55.9 VITAMIN D DEFICIENCY: ICD-10-CM

## 2018-08-30 DIAGNOSIS — G89.29 CHRONIC PAIN OF LEFT KNEE: Primary | ICD-10-CM

## 2018-08-30 DIAGNOSIS — F32.0 MILD SINGLE CURRENT EPISODE OF MAJOR DEPRESSIVE DISORDER (HCC): ICD-10-CM

## 2018-08-30 DIAGNOSIS — M25.562 CHRONIC PAIN OF LEFT KNEE: Primary | ICD-10-CM

## 2018-08-30 LAB
ALBUMIN SERPL BCP-MCNC: 4.3 G/DL (ref 3.5–4.8)
ALBUMIN/GLOB SERPL: 1.4 {RATIO} (ref 1–2)
ALP SERPL-CCNC: 108 U/L (ref 32–100)
ALT SERPL-CCNC: 37 U/L (ref 14–54)
ANION GAP SERPL CALC-SCNC: 8 MMOL/L (ref 0–18)
AST SERPL-CCNC: 30 U/L (ref 15–41)
BACTERIA UR QL AUTO: NEGATIVE /HPF
BASOPHILS # BLD: 0 K/UL (ref 0–0.2)
BASOPHILS NFR BLD: 0 %
BILIRUB SERPL-MCNC: 0.8 MG/DL (ref 0.3–1.2)
BILIRUB UR QL: NEGATIVE
BUN SERPL-MCNC: 8 MG/DL (ref 8–20)
BUN/CREAT SERPL: 10.4 (ref 10–20)
CALCIUM SERPL-MCNC: 9.6 MG/DL (ref 8.5–10.5)
CHLORIDE SERPL-SCNC: 107 MMOL/L (ref 95–110)
CHOLEST SERPL-MCNC: 232 MG/DL (ref 110–200)
CLARITY UR: CLEAR
CO2 SERPL-SCNC: 27 MMOL/L (ref 22–32)
COLOR UR: YELLOW
CREAT SERPL-MCNC: 0.77 MG/DL (ref 0.5–1.5)
EOSINOPHIL # BLD: 0.1 K/UL (ref 0–0.7)
EOSINOPHIL NFR BLD: 1 %
ERYTHROCYTE [DISTWIDTH] IN BLOOD BY AUTOMATED COUNT: 14.7 % (ref 11–15)
GLOBULIN PLAS-MCNC: 3 G/DL (ref 2.5–3.7)
GLUCOSE SERPL-MCNC: 94 MG/DL (ref 70–99)
GLUCOSE UR-MCNC: NEGATIVE MG/DL
HCT VFR BLD AUTO: 47.8 % (ref 35–48)
HDLC SERPL-MCNC: 32 MG/DL
HGB BLD-MCNC: 16.3 G/DL (ref 12–16)
HGB UR QL STRIP.AUTO: NEGATIVE
KETONES UR-MCNC: NEGATIVE MG/DL
LDLC SERPL CALC-MCNC: 176 MG/DL (ref 0–99)
LYMPHOCYTES # BLD: 2.6 K/UL (ref 1–4)
LYMPHOCYTES NFR BLD: 24 %
MCH RBC QN AUTO: 30.3 PG (ref 27–32)
MCHC RBC AUTO-ENTMCNC: 34.1 G/DL (ref 32–37)
MCV RBC AUTO: 89 FL (ref 80–100)
MONOCYTES # BLD: 1 K/UL (ref 0–1)
MONOCYTES NFR BLD: 9 %
NEUTROPHILS # BLD AUTO: 6.8 K/UL (ref 1.8–7.7)
NEUTROPHILS NFR BLD: 65 %
NITRITE UR QL STRIP.AUTO: NEGATIVE
NONHDLC SERPL-MCNC: 200 MG/DL
OSMOLALITY UR CALC.SUM OF ELEC: 292 MOSM/KG (ref 275–295)
PATIENT FASTING: YES
PH UR: 6 [PH] (ref 5–8)
PLATELET # BLD AUTO: 232 K/UL (ref 140–400)
PMV BLD AUTO: 8.9 FL (ref 7.4–10.3)
POTASSIUM SERPL-SCNC: 4.1 MMOL/L (ref 3.3–5.1)
PROT SERPL-MCNC: 7.3 G/DL (ref 5.9–8.4)
PROT UR-MCNC: NEGATIVE MG/DL
RBC # BLD AUTO: 5.38 M/UL (ref 3.7–5.4)
RBC #/AREA URNS AUTO: 2 /HPF
SODIUM SERPL-SCNC: 142 MMOL/L (ref 136–144)
SP GR UR STRIP: 1.01 (ref 1–1.03)
TRIGL SERPL-MCNC: 122 MG/DL (ref 1–149)
TSH SERPL-ACNC: 1.51 UIU/ML (ref 0.45–5.33)
UROBILINOGEN UR STRIP-ACNC: <2
VIT C UR-MCNC: NEGATIVE MG/DL
WBC # BLD AUTO: 10.5 K/UL (ref 4–11)
WBC #/AREA URNS AUTO: 11 /HPF

## 2018-08-30 PROCEDURE — 99212 OFFICE O/P EST SF 10 MIN: CPT | Performed by: INTERNAL MEDICINE

## 2018-08-30 PROCEDURE — 80061 LIPID PANEL: CPT

## 2018-08-30 PROCEDURE — 36415 COLL VENOUS BLD VENIPUNCTURE: CPT

## 2018-08-30 PROCEDURE — 82306 VITAMIN D 25 HYDROXY: CPT | Performed by: INTERNAL MEDICINE

## 2018-08-30 PROCEDURE — 81001 URINALYSIS AUTO W/SCOPE: CPT

## 2018-08-30 PROCEDURE — 85025 COMPLETE CBC W/AUTO DIFF WBC: CPT

## 2018-08-30 PROCEDURE — 99214 OFFICE O/P EST MOD 30 MIN: CPT | Performed by: INTERNAL MEDICINE

## 2018-08-30 PROCEDURE — 80053 COMPREHEN METABOLIC PANEL: CPT

## 2018-08-30 PROCEDURE — 84443 ASSAY THYROID STIM HORMONE: CPT

## 2018-08-30 RX ORDER — TRAMADOL HYDROCHLORIDE 50 MG/1
50 TABLET ORAL EVERY 6 HOURS PRN
Qty: 30 TABLET | Refills: 0 | Status: SHIPPED | OUTPATIENT
Start: 2018-08-30 | End: 2019-09-23

## 2018-08-30 NOTE — PROGRESS NOTES
Laurel Padilla is a 45year old female. HPI:     1. Chronic pain of left knee    Patient developed a sore knee that was painful to walk.  Applied cold ice lance to knee and developed a burn with a fluid collection that formed and enlarged over knee DULoxetine HCl (CYMBALTA) 60 MG Oral Cap DR Particles Take 60 mg by mouth daily. Disp:  Rfl:    Sertraline HCl (ZOLOFT) 25 MG Oral Tab Take 1 tablet by mouth daily.  Disp: 90 tablet Rfl: 1      Past Medical History:   Diagnosis Date   • Hyperlipidemia food or tylenol XS (500 mg) up to 4 daily. (1 every 6 hours) for pain pain relief. Glucosamine chondroitin 500/400 can be tried OTC (over the counter) twice daily as well. Use ibuprofen 600 TID with food to help arthritis pain.  See orthopedics if pain persi

## 2018-08-31 LAB — 25(OH)D3 SERPL-MCNC: 44.9 NG/ML

## 2018-09-04 NOTE — TELEPHONE ENCOUNTER
No Protocol on this med. Pending Prescriptions Disp Refills    IBUPROFEN 600 MG Oral Tab [Pharmacy Med Name: IBUPROFEN 600MG TABLETS] 20 tablet 0     Sig: TAKE 1 TABLET BY MOUTH EVERY 8 HOURS AS NEEDED           LOV 8-30-18  LORELEI 6-19-18 # 20    pls advise, thanks in advance.

## 2018-09-05 RX ORDER — IBUPROFEN 600 MG/1
TABLET ORAL
Qty: 20 TABLET | Refills: 0 | Status: SHIPPED | OUTPATIENT
Start: 2018-09-05 | End: 2018-11-26

## 2018-09-11 RX ORDER — IBUPROFEN 600 MG/1
TABLET ORAL
Qty: 20 TABLET | Refills: 0 | OUTPATIENT
Start: 2018-09-11

## 2018-11-06 VITALS — TEMPERATURE: 98.4 F

## 2018-11-25 RX ORDER — DICLOFENAC SODIUM 75 MG/1
TABLET, DELAYED RELEASE ORAL
Qty: 180 TABLET | Refills: 0 | Status: SHIPPED | OUTPATIENT
Start: 2018-11-25 | End: 2019-02-19

## 2018-11-25 RX ORDER — GABAPENTIN 400 MG/1
CAPSULE ORAL
Qty: 360 CAPSULE | Refills: 0 | Status: SHIPPED | OUTPATIENT
Start: 2018-11-25 | End: 2019-02-19

## 2018-11-26 RX ORDER — IBUPROFEN 600 MG/1
TABLET ORAL
Qty: 20 TABLET | Refills: 0 | Status: SHIPPED | OUTPATIENT
Start: 2018-11-26 | End: 2018-11-28

## 2018-11-28 RX ORDER — IBUPROFEN 600 MG/1
TABLET ORAL
Qty: 300 TABLET | Refills: 0 | Status: SHIPPED | OUTPATIENT
Start: 2018-11-28 | End: 2019-03-21

## 2018-12-03 RX ORDER — ATORVASTATIN CALCIUM 80 MG/1
TABLET, FILM COATED ORAL
Qty: 90 TABLET | Refills: 1 | Status: SHIPPED | OUTPATIENT
Start: 2018-12-03 | End: 2019-05-19

## 2019-02-19 RX ORDER — DICLOFENAC SODIUM 75 MG/1
TABLET, DELAYED RELEASE ORAL
Qty: 180 TABLET | Refills: 0 | Status: SHIPPED | OUTPATIENT
Start: 2019-02-19 | End: 2019-04-26

## 2019-02-19 RX ORDER — GABAPENTIN 400 MG/1
CAPSULE ORAL
Qty: 360 CAPSULE | Refills: 0 | Status: SHIPPED | OUTPATIENT
Start: 2019-02-19 | End: 2019-04-26

## 2019-03-23 NOTE — TELEPHONE ENCOUNTER
Please advise in regards to refill request. Thank You  Refill Protocol Appointment Criteria  · Appointment scheduled in the past 6 months or in the next 3 months  Recent Outpatient Visits            6 months ago Chronic pain of left knee    [address]

## 2019-03-24 RX ORDER — IBUPROFEN 600 MG/1
TABLET ORAL
Qty: 300 TABLET | Refills: 0 | Status: SHIPPED | OUTPATIENT
Start: 2019-03-24 | End: 2019-09-04

## 2019-04-26 RX ORDER — POLYETHYLENE GLYCOL 3350 17 G/17G
POWDER, FOR SOLUTION ORAL
Qty: 527 G | Refills: 0 | Status: SHIPPED | OUTPATIENT
Start: 2019-04-26

## 2019-04-27 NOTE — TELEPHONE ENCOUNTER
Refill passed per [facility] protocol.   Refill Protocol Appointment Criteria  · Appointment scheduled in the past 12 months or in the next 3 months  Recent Outpatient Visits            7 months ago Chronic pain of left knee    [facility], [facility], [address]

## 2019-04-29 RX ORDER — GABAPENTIN 400 MG/1
CAPSULE ORAL
Qty: 360 CAPSULE | Refills: 0 | Status: SHIPPED | OUTPATIENT
Start: 2019-04-29 | End: 2019-07-29

## 2019-04-29 RX ORDER — DICLOFENAC SODIUM 75 MG/1
TABLET, DELAYED RELEASE ORAL
Qty: 180 TABLET | Refills: 0 | Status: SHIPPED | OUTPATIENT
Start: 2019-04-29 | End: 2019-07-29

## 2019-05-20 RX ORDER — ATORVASTATIN CALCIUM 80 MG/1
TABLET, FILM COATED ORAL
Qty: 90 TABLET | Refills: 1 | Status: SHIPPED | OUTPATIENT
Start: 2019-05-20 | End: 2019-11-18

## 2019-07-30 NOTE — TELEPHONE ENCOUNTER
Please review; protocol failed.   LOV-8/30/18    Requested Prescriptions   Pending Prescriptions Disp Refills   • DICLOFENAC SODIUM 75 MG Oral Tab EC [Pharmacy Med Name: DICLOFENAC SODIUM 75MG DR TABLETS] 180 tablet 0     Sig: TAKE 1 TABLET BY MOUTH TWICE D

## 2019-07-31 RX ORDER — DICLOFENAC SODIUM 75 MG/1
TABLET, DELAYED RELEASE ORAL
Qty: 180 TABLET | Refills: 0 | Status: SHIPPED | OUTPATIENT
Start: 2019-07-31 | End: 2019-09-23

## 2019-07-31 RX ORDER — GABAPENTIN 400 MG/1
CAPSULE ORAL
Qty: 360 CAPSULE | Refills: 0 | Status: SHIPPED | OUTPATIENT
Start: 2019-07-31 | End: 2020-03-30

## 2019-08-26 ENCOUNTER — HOSPITAL ENCOUNTER (EMERGENCY)
Facility: HOSPITAL | Age: 54
Discharge: HOME | End: 2019-08-26
Attending: EMERGENCY MEDICINE
Payer: MEDICARE

## 2019-08-26 VITALS
BODY MASS INDEX: 31.65 KG/M2 | HEART RATE: 78 BPM | OXYGEN SATURATION: 98 % | WEIGHT: 190 LBS | SYSTOLIC BLOOD PRESSURE: 124 MMHG | HEIGHT: 65 IN | DIASTOLIC BLOOD PRESSURE: 67 MMHG | RESPIRATION RATE: 18 BRPM | TEMPERATURE: 98 F

## 2019-08-26 DIAGNOSIS — M54.17 LUMBOSACRAL RADICULOPATHY: Primary | ICD-10-CM

## 2019-08-26 PROCEDURE — 99284 EMERGENCY DEPT VISIT MOD MDM: CPT

## 2019-08-26 PROCEDURE — 96372 THER/PROPH/DIAG INJ SC/IM: CPT

## 2019-08-26 RX ORDER — METHYLPREDNISOLONE 4 MG/1
TABLET ORAL
Qty: 1 PACKAGE | Refills: 0 | Status: SHIPPED | OUTPATIENT
Start: 2019-08-26 | End: 2019-09-09 | Stop reason: ALTCHOICE

## 2019-08-26 RX ORDER — MORPHINE SULFATE 4 MG/ML
4 INJECTION, SOLUTION INTRAMUSCULAR; INTRAVENOUS ONCE
Status: COMPLETED | OUTPATIENT
Start: 2019-08-26 | End: 2019-08-26

## 2019-08-26 RX ORDER — CYCLOBENZAPRINE HCL 10 MG
10 TABLET ORAL 3 TIMES DAILY PRN
Qty: 30 TABLET | Refills: 0 | Status: SHIPPED | OUTPATIENT
Start: 2019-08-26 | End: 2019-09-05

## 2019-08-26 RX ORDER — DEXAMETHASONE SODIUM PHOSPHATE 10 MG/ML
10 INJECTION, SOLUTION INTRAMUSCULAR; INTRAVENOUS ONCE
Status: COMPLETED | OUTPATIENT
Start: 2019-08-26 | End: 2019-08-26

## 2019-08-26 RX ORDER — HYDROCODONE BITARTRATE AND ACETAMINOPHEN 5; 325 MG/1; MG/1
1 TABLET ORAL EVERY 6 HOURS PRN
Qty: 15 TABLET | Refills: 0 | Status: SHIPPED | OUTPATIENT
Start: 2019-08-26 | End: 2019-09-09 | Stop reason: ALTCHOICE

## 2019-08-26 NOTE — ED INITIAL ASSESSMENT (HPI)
Pt states left calf pain since Thursday evening- states atraumatic- states that she flew back from [facility] Wednesday. Pt also states that she \"pulled weeds\" and that may have been the reason for the pain.

## 2019-08-27 NOTE — ED PROVIDER NOTES
Patient Seen in: [facility] AND [facility] Emergency Department    History   Patient presents with:  Leg Pain      HPI    Patient presents to the ED complaining of pain in her left calf, thigh, buttock, and lower back.   She states pain started gradually the morn 20        Types: Cigarettes      Smokeless tobacco: Never Used    Substance and Sexual Activity      Alcohol use: No    Other Topics      Concerns:        Caffeine Concern: Yes          (Coffee, Soda) 3 cups daily      ROS  Pertinent Positives: Back pain, MG/ML injection 4 mg (4 mg Intramuscular Given 8/26/19 1954)         MDM      08/26/19 1818 08/26/19 1819   BP:  124/67   Pulse:  78   Resp:  18   Temp:  98.1 °F (36.7 °C)   TempSrc:  Oral   SpO2:  98%   Weight: 86.2 kg    Height: 165.1 cm (5' 5\") as directed on packaging, Print, Disp-1 Package, R-0    Cyclobenzaprine HCl 10 MG Oral Tab  Take 1 tablet (10 mg total) by mouth 3 (three) times daily as needed for Muscle spasms. , Print, Disp-30 tablet, R-0    HYDROcodone-acetaminophen 5-325 MG Oral Tab

## 2019-09-04 ENCOUNTER — OFFICE VISIT (OUTPATIENT)
Dept: INTERNAL MEDICINE CLINIC | Facility: CLINIC | Age: 54
End: 2019-09-04
Payer: MEDICARE

## 2019-09-04 VITALS
RESPIRATION RATE: 16 BRPM | HEART RATE: 95 BPM | WEIGHT: 203 LBS | SYSTOLIC BLOOD PRESSURE: 119 MMHG | BODY MASS INDEX: 33.82 KG/M2 | DIASTOLIC BLOOD PRESSURE: 54 MMHG | HEIGHT: 65 IN

## 2019-09-04 DIAGNOSIS — E66.01 OBESITY, MORBID (HCC): ICD-10-CM

## 2019-09-04 DIAGNOSIS — M54.50 LUMBAR PAIN WITH RADIATION DOWN LEFT LEG: Primary | ICD-10-CM

## 2019-09-04 DIAGNOSIS — F32.0 MILD SINGLE CURRENT EPISODE OF MAJOR DEPRESSIVE DISORDER (HCC): ICD-10-CM

## 2019-09-04 DIAGNOSIS — M79.605 LUMBAR PAIN WITH RADIATION DOWN LEFT LEG: Primary | ICD-10-CM

## 2019-09-04 PROCEDURE — G0463 HOSPITAL OUTPT CLINIC VISIT: HCPCS | Performed by: INTERNAL MEDICINE

## 2019-09-04 PROCEDURE — 99214 OFFICE O/P EST MOD 30 MIN: CPT | Performed by: INTERNAL MEDICINE

## 2019-09-04 RX ORDER — TRAMADOL HYDROCHLORIDE 50 MG/1
50 TABLET ORAL EVERY 6 HOURS PRN
Qty: 30 TABLET | Refills: 1 | Status: SHIPPED | OUTPATIENT
Start: 2019-09-04 | End: 2019-09-09

## 2019-09-04 NOTE — PROGRESS NOTES
Jeannette Escalona is a 52year old female. HPI:     1. Lumbar pain with radiation down left leg    Has been having back pain that travels down her left leg. Went to ER and given steroids, Hydrocodone and cyclobenzaprine.      2. Mild single current e tablet by mouth daily.  Disp: 90 tablet Rfl: 1   methylPREDNISolone 4 MG Oral Tablet Therapy Pack Dosepack: use as directed on packaging Disp: 1 Package Rfl: 0   HYDROcodone-acetaminophen 5-325 MG Oral Tab Take 1 tablet by mouth every 6 (six) hours as neede clubbing or edema/ Right knee with swelling ,warmth and tenderness. No redness or drainage. ASSESSMENT AND PLAN:   1. Lumbar pain with radiation down left leg    Ice your back for the first 24 hours after pain starts.  Take ibuprofen 400-600 mg with food

## 2019-09-09 ENCOUNTER — LAB ENCOUNTER (OUTPATIENT)
Dept: LAB | Facility: HOSPITAL | Age: 54
End: 2019-09-09
Attending: INTERNAL MEDICINE
Payer: MEDICARE

## 2019-09-09 ENCOUNTER — OFFICE VISIT (OUTPATIENT)
Dept: NEUROLOGY | Facility: CLINIC | Age: 54
End: 2019-09-09
Payer: MEDICARE

## 2019-09-09 VITALS
WEIGHT: 200 LBS | BODY MASS INDEX: 32.92 KG/M2 | RESPIRATION RATE: 20 BRPM | HEART RATE: 66 BPM | DIASTOLIC BLOOD PRESSURE: 70 MMHG | HEIGHT: 65.5 IN | SYSTOLIC BLOOD PRESSURE: 120 MMHG

## 2019-09-09 DIAGNOSIS — F32.0 MILD SINGLE CURRENT EPISODE OF MAJOR DEPRESSIVE DISORDER (HCC): ICD-10-CM

## 2019-09-09 DIAGNOSIS — M54.16 LUMBAR RADICULOPATHY: Primary | ICD-10-CM

## 2019-09-09 DIAGNOSIS — F41.1 ANXIETY STATE: ICD-10-CM

## 2019-09-09 DIAGNOSIS — G89.4 CHRONIC PAIN SYNDROME: ICD-10-CM

## 2019-09-09 DIAGNOSIS — E66.3 PATIENT OVERWEIGHT: ICD-10-CM

## 2019-09-09 DIAGNOSIS — E66.01 OBESITY, MORBID (HCC): ICD-10-CM

## 2019-09-09 DIAGNOSIS — G47.00 INSOMNIA, UNSPECIFIED TYPE: ICD-10-CM

## 2019-09-09 DIAGNOSIS — F32.0 CURRENT MILD EPISODE OF MAJOR DEPRESSIVE DISORDER WITHOUT PRIOR EPISODE (HCC): ICD-10-CM

## 2019-09-09 LAB
ALBUMIN SERPL-MCNC: 3.7 G/DL (ref 3.4–5)
ALBUMIN/GLOB SERPL: 1.1 {RATIO} (ref 1–2)
ALP LIVER SERPL-CCNC: 132 U/L (ref 41–108)
ALT SERPL-CCNC: 36 U/L (ref 13–56)
ANION GAP SERPL CALC-SCNC: 6 MMOL/L (ref 0–18)
AST SERPL-CCNC: 17 U/L (ref 15–37)
BASOPHILS # BLD AUTO: 0.04 X10(3) UL (ref 0–0.2)
BASOPHILS NFR BLD AUTO: 0.3 %
BILIRUB SERPL-MCNC: 0.5 MG/DL (ref 0.1–2)
BILIRUB UR QL: NEGATIVE
BUN BLD-MCNC: 9 MG/DL (ref 7–18)
BUN/CREAT SERPL: 11.3 (ref 10–20)
CALCIUM BLD-MCNC: 9.6 MG/DL (ref 8.5–10.1)
CHLORIDE SERPL-SCNC: 113 MMOL/L (ref 98–112)
CHOLEST SMN-MCNC: 193 MG/DL (ref ?–200)
CLARITY UR: CLEAR
CO2 SERPL-SCNC: 26 MMOL/L (ref 21–32)
COLOR UR: YELLOW
CREAT BLD-MCNC: 0.8 MG/DL (ref 0.55–1.02)
DEPRECATED RDW RBC AUTO: 49 FL (ref 35.1–46.3)
EOSINOPHIL # BLD AUTO: 0.16 X10(3) UL (ref 0–0.7)
EOSINOPHIL NFR BLD AUTO: 1.3 %
ERYTHROCYTE [DISTWIDTH] IN BLOOD BY AUTOMATED COUNT: 14.7 % (ref 11–15)
GLOBULIN PLAS-MCNC: 3.5 G/DL (ref 2.8–4.4)
GLUCOSE BLD-MCNC: 94 MG/DL (ref 70–99)
GLUCOSE UR-MCNC: NEGATIVE MG/DL
HCT VFR BLD AUTO: 45.1 % (ref 35–48)
HDLC SERPL-MCNC: 40 MG/DL (ref 40–59)
HGB BLD-MCNC: 15.2 G/DL (ref 12–16)
HGB UR QL STRIP.AUTO: NEGATIVE
IMM GRANULOCYTES # BLD AUTO: 0.05 X10(3) UL (ref 0–1)
IMM GRANULOCYTES NFR BLD: 0.4 %
KETONES UR-MCNC: NEGATIVE MG/DL
LDLC SERPL CALC-MCNC: 127 MG/DL (ref ?–100)
LYMPHOCYTES # BLD AUTO: 2.77 X10(3) UL (ref 1–4)
LYMPHOCYTES NFR BLD AUTO: 23.2 %
M PROTEIN MFR SERPL ELPH: 7.2 G/DL (ref 6.4–8.2)
MCH RBC QN AUTO: 30.2 PG (ref 26–34)
MCHC RBC AUTO-ENTMCNC: 33.7 G/DL (ref 31–37)
MCV RBC AUTO: 89.7 FL (ref 80–100)
MONOCYTES # BLD AUTO: 0.81 X10(3) UL (ref 0.1–1)
MONOCYTES NFR BLD AUTO: 6.8 %
NEUTROPHILS # BLD AUTO: 8.12 X10 (3) UL (ref 1.5–7.7)
NEUTROPHILS # BLD AUTO: 8.12 X10(3) UL (ref 1.5–7.7)
NEUTROPHILS NFR BLD AUTO: 68 %
NITRITE UR QL STRIP.AUTO: NEGATIVE
NONHDLC SERPL-MCNC: 153 MG/DL (ref ?–130)
OSMOLALITY SERPL CALC.SUM OF ELEC: 298 MOSM/KG (ref 275–295)
PATIENT FASTING: YES
PATIENT FASTING: YES
PH UR: 6 [PH] (ref 5–8)
PLATELET # BLD AUTO: 234 10(3)UL (ref 150–450)
POTASSIUM SERPL-SCNC: 4.5 MMOL/L (ref 3.5–5.1)
PROT UR-MCNC: NEGATIVE MG/DL
RBC # BLD AUTO: 5.03 X10(6)UL (ref 3.8–5.3)
RBC #/AREA URNS AUTO: 3 /HPF
SODIUM SERPL-SCNC: 145 MMOL/L (ref 136–145)
SP GR UR STRIP: 1.01 (ref 1–1.03)
TRIGL SERPL-MCNC: 128 MG/DL (ref 30–149)
TSI SER-ACNC: 0.77 MIU/ML (ref 0.36–3.74)
UROBILINOGEN UR STRIP-ACNC: <2
VIT C UR-MCNC: NEGATIVE MG/DL
VLDLC SERPL CALC-MCNC: 26 MG/DL (ref 0–30)
WBC # BLD AUTO: 12 X10(3) UL (ref 4–11)
WBC #/AREA URNS AUTO: 26 /HPF

## 2019-09-09 PROCEDURE — 84443 ASSAY THYROID STIM HORMONE: CPT

## 2019-09-09 PROCEDURE — 99204 OFFICE O/P NEW MOD 45 MIN: CPT | Performed by: PHYSICAL MEDICINE & REHABILITATION

## 2019-09-09 PROCEDURE — 80053 COMPREHEN METABOLIC PANEL: CPT

## 2019-09-09 PROCEDURE — 81001 URINALYSIS AUTO W/SCOPE: CPT

## 2019-09-09 PROCEDURE — 80061 LIPID PANEL: CPT

## 2019-09-09 PROCEDURE — 36415 COLL VENOUS BLD VENIPUNCTURE: CPT

## 2019-09-09 PROCEDURE — 85025 COMPLETE CBC W/AUTO DIFF WBC: CPT

## 2019-09-09 RX ORDER — DIAZEPAM 5 MG/1
5 TABLET ORAL EVERY 6 HOURS PRN
Qty: 28 TABLET | Refills: 0 | Status: SHIPPED | OUTPATIENT
Start: 2019-09-09 | End: 2019-09-16

## 2019-09-09 RX ORDER — PREDNISONE 10 MG/1
TABLET ORAL
Qty: 28 TABLET | Refills: 0 | Status: SHIPPED | OUTPATIENT
Start: 2019-09-09 | End: 2019-09-23 | Stop reason: ALTCHOICE

## 2019-09-09 NOTE — PROGRESS NOTES
[address]  Progress Note    CHIEF COMPLAINT:  Patient presents with:  Low Back Pain: Patient presents for low back pain, has had pain since 8/26/19, went to ER 8/26/19, had 2 injections, was prescribed flex SOCIAL HISTORY:   Social History    Occupational History      Not on file    Tobacco Use      Smoking status: Current Every Day Smoker        Packs/day: 1.00        Years: 20.00        Pack years: 20        Types: Cigarettes      Smokeless tobacco: N Respiratory  Painful Breathing: admits  Wheezing: denies   Gastrointestinal  Bowel Incontinence: denies  Heartburn: denies  Abdominal Pain: denies  Blood in Stool : denies  Rectal Pain: denies   Hematology  Easy Bruising: denies  Easy Bleeding: denies multilevel DDD and endplate claw osteophytes reminiscent of DISH. ASSESSMENT AND PLAN:  1. Lumbar radiculopathy  Medrol no help. Will add prednisone. Add PT, extension based. If no better would do MRI and consider MARCO.      2. Chronic pain syndrome  W

## 2019-09-10 PROBLEM — E66.01 OBESITY, MORBID (HCC): Status: ACTIVE | Noted: 2019-09-10

## 2019-09-23 ENCOUNTER — TELEPHONE (OUTPATIENT)
Dept: NEUROLOGY | Facility: CLINIC | Age: 54
End: 2019-09-23

## 2019-09-23 ENCOUNTER — OFFICE VISIT (OUTPATIENT)
Dept: NEUROLOGY | Facility: CLINIC | Age: 54
End: 2019-09-23
Payer: MEDICARE

## 2019-09-23 VITALS
WEIGHT: 200 LBS | DIASTOLIC BLOOD PRESSURE: 80 MMHG | SYSTOLIC BLOOD PRESSURE: 130 MMHG | HEART RATE: 78 BPM | RESPIRATION RATE: 20 BRPM | HEIGHT: 65.5 IN | BODY MASS INDEX: 32.92 KG/M2

## 2019-09-23 DIAGNOSIS — E66.3 PATIENT OVERWEIGHT: ICD-10-CM

## 2019-09-23 DIAGNOSIS — G89.4 CHRONIC PAIN SYNDROME: ICD-10-CM

## 2019-09-23 DIAGNOSIS — F41.1 ANXIETY STATE: ICD-10-CM

## 2019-09-23 DIAGNOSIS — G47.00 INSOMNIA, UNSPECIFIED TYPE: ICD-10-CM

## 2019-09-23 DIAGNOSIS — M54.16 LUMBAR RADICULOPATHY: Primary | ICD-10-CM

## 2019-09-23 DIAGNOSIS — F32.0 CURRENT MILD EPISODE OF MAJOR DEPRESSIVE DISORDER WITHOUT PRIOR EPISODE (HCC): ICD-10-CM

## 2019-09-23 PROCEDURE — 99214 OFFICE O/P EST MOD 30 MIN: CPT | Performed by: PHYSICAL MEDICINE & REHABILITATION

## 2019-09-23 RX ORDER — CYCLOBENZAPRINE HCL 10 MG
10 TABLET ORAL NIGHTLY
Qty: 30 TABLET | Refills: 0 | Status: SHIPPED | OUTPATIENT
Start: 2019-09-23 | End: 2019-10-23

## 2019-09-23 NOTE — PROGRESS NOTES
[address]  Progress Note    CHIEF COMPLAINT:  Patient presents with:  Low Back Pain: Patient presents for follow up on low back pain, LOV:9/9/19. Completed medrol dose pack, states her pain is worse, but get tear (right))       SOCIAL HISTORY:   Social History    Occupational History      Not on file    Tobacco Use      Smoking status: Current Every Day Smoker        Packs/day: 1.00        Years: 20.00        Pack years: 20        Types: Cigarettes      Smokel Sitting, Cuff Size: adult)   Pulse 78   Resp 20   Ht 65.5\"   Wt 200 lb   BMI 32.78 kg/m²      Body mass index is 32.78 kg/m². General: No immediate distress  Eyes: Extra-occular movements intact. Heart: peripheral pulses intact.  Normal capillary re

## 2019-09-23 NOTE — TELEPHONE ENCOUNTER
Medicare Online for insurance coverage of MRI L-spine wo cpt code 64460. Insurance was verified and procedure is a covered benefit and does not require authorization. Will call Pt. To inform. Pt. informed no authorization is required.  She will schedule ap

## 2019-10-01 ENCOUNTER — HOSPITAL ENCOUNTER (OUTPATIENT)
Dept: MRI IMAGING | Facility: HOSPITAL | Age: 54
Discharge: HOME | End: 2019-10-01
Attending: PHYSICAL MEDICINE & REHABILITATION
Payer: MEDICARE

## 2019-10-01 DIAGNOSIS — M54.16 LUMBAR RADICULOPATHY: ICD-10-CM

## 2019-10-01 PROCEDURE — 72148 MRI LUMBAR SPINE W/O DYE: CPT | Performed by: PHYSICAL MEDICINE & REHABILITATION

## 2019-10-03 ENCOUNTER — TELEPHONE (OUTPATIENT)
Dept: NEUROLOGY | Facility: CLINIC | Age: 54
End: 2019-10-03

## 2019-10-03 NOTE — TELEPHONE ENCOUNTER
----- Message from Zeenat Solomon MD sent at 10/2/2019  5:50 PM CDT -----  Personally reviewed cervical MRI from October 2019 showing a relatively large extruded disc extending from L3-4 to the L4-5 disc space on the left which impacts the lateral reces

## 2019-10-08 ENCOUNTER — APPOINTMENT (OUTPATIENT)
Dept: PHYSICAL THERAPY | Facility: HOSPITAL | Age: 54
End: 2019-10-08
Attending: PHYSICAL MEDICINE & REHABILITATION
Payer: MEDICARE

## 2019-10-12 NOTE — TELEPHONE ENCOUNTER
[employer] message sent. RN=call patient and verify if she requested for refill, per our record, it was discontinued already.

## 2019-10-14 RX ORDER — IBUPROFEN 600 MG/1
TABLET ORAL
Qty: 300 TABLET | Refills: 1 | Status: SHIPPED | OUTPATIENT
Start: 2019-10-14 | End: 2019-10-25

## 2019-10-14 NOTE — TELEPHONE ENCOUNTER
Refill passed per [facility] protocol.   Refill Protocol Appointment Criteria  · Appointment scheduled in the past 6 months or in the next 3 months  Recent Outpatient Visits            3 weeks ago Lumbar radiculopathy    [employer]

## 2019-10-25 ENCOUNTER — TELEPHONE (OUTPATIENT)
Dept: NEUROLOGY | Facility: CLINIC | Age: 54
End: 2019-10-25

## 2019-10-25 ENCOUNTER — OFFICE VISIT (OUTPATIENT)
Dept: NEUROLOGY | Facility: CLINIC | Age: 54
End: 2019-10-25
Payer: MEDICARE

## 2019-10-25 VITALS
WEIGHT: 200 LBS | RESPIRATION RATE: 20 BRPM | SYSTOLIC BLOOD PRESSURE: 124 MMHG | HEIGHT: 65.5 IN | HEART RATE: 68 BPM | BODY MASS INDEX: 32.92 KG/M2 | DIASTOLIC BLOOD PRESSURE: 78 MMHG

## 2019-10-25 DIAGNOSIS — F41.1 ANXIETY STATE: ICD-10-CM

## 2019-10-25 DIAGNOSIS — M54.16 LUMBAR RADICULOPATHY: Primary | ICD-10-CM

## 2019-10-25 DIAGNOSIS — G47.00 INSOMNIA, UNSPECIFIED TYPE: ICD-10-CM

## 2019-10-25 DIAGNOSIS — E66.3 PATIENT OVERWEIGHT: ICD-10-CM

## 2019-10-25 PROCEDURE — 99214 OFFICE O/P EST MOD 30 MIN: CPT | Performed by: PHYSICAL MEDICINE & REHABILITATION

## 2019-10-25 RX ORDER — DICLOFENAC SODIUM 75 MG/1
75 TABLET, DELAYED RELEASE ORAL 2 TIMES DAILY
COMMUNITY
End: 2020-03-30

## 2019-10-25 NOTE — TELEPHONE ENCOUNTER
Medicare Online for insurance coverage of Left L4-L5 TFESI cpt code 94961. Insurance was verified and procedure  is a covered benefit and does not require authorization.   Will inform Nursing

## 2019-10-25 NOTE — PROGRESS NOTES
[address]  Progress Note    CHIEF COMPLAINT:  Patient presents with:  Low Back Pain: Patient presents for follow up on low back pain, LOV:9/23/19.  Patient had MRI 10/1/19, still has pain, rated pain a 6-7/1 status: Current Every Day Smoker        Packs/day: 1.00        Years: 20.00        Pack years: 20        Types: Cigarettes      Smokeless tobacco: Never Used    Substance and Sexual Activity      Alcohol use: No      Drug use: Never      Sexual activity: N EXAM:   /78 (BP Location: Right arm, Patient Position: Sitting, Cuff Size: adult)   Pulse 68   Resp 20   Ht 65.5\"   Wt 200 lb (90.7 kg)   BMI 32.78 kg/m²     Body mass index is 32.78 kg/m².       General: No immediate distress  Eyes: Extra-occular mo MD  Physical Medicine and Rehabilitation/Sports Medicine  MEDICAL CENTER [facility]

## 2019-10-28 NOTE — TELEPHONE ENCOUNTER
Patient has been scheduled for a Left L4-L5 TFESI on 10/28/19 at the [facility]. Medications and allergies reviewed. Patient informed to hold aspirins, nsaids, blood thinners, multivitamins, vitamin E and fish oils 3-7 days prior to procedure.  Patient informed s

## 2019-11-04 ENCOUNTER — MED REC SCAN ONLY (OUTPATIENT)
Dept: NEUROLOGY | Facility: CLINIC | Age: 54
End: 2019-11-04

## 2019-11-07 ENCOUNTER — OFFICE VISIT (OUTPATIENT)
Dept: SURGERY | Facility: CLINIC | Age: 54
End: 2019-11-07
Payer: MEDICARE

## 2019-11-07 DIAGNOSIS — M54.16 LUMBAR RADICULOPATHY: Primary | ICD-10-CM

## 2019-11-07 PROCEDURE — 64484 NJX AA&/STRD TFRM EPI L/S EA: CPT | Performed by: PHYSICAL MEDICINE & REHABILITATION

## 2019-11-07 PROCEDURE — 64483 NJX AA&/STRD TFRM EPI L/S 1: CPT | Performed by: PHYSICAL MEDICINE & REHABILITATION

## 2019-11-07 NOTE — PROCEDURES
Preoperative Diagnosis:  (M54.16) Lumbar radiculopathy  (primary encounter diagnosis)       Postoperative Diagnosis:  (M54.16) Lumbar radiculopathy  (primary encounter diagnosis)       Procedures: Left L4 and L5 Transforaminal epidural steroid injection un

## 2019-11-11 ENCOUNTER — TELEPHONE (OUTPATIENT)
Dept: NEUROLOGY | Facility: CLINIC | Age: 54
End: 2019-11-11

## 2019-11-11 NOTE — TELEPHONE ENCOUNTER
Still having pain after her injection this past Thursday. Explained to her that it could take up to 7-10 days for the medication to work. She understood and was happy to find this out.     She does have a follow up set up to see Dr. Estefani Carson on 11/25/19 and

## 2019-11-18 RX ORDER — ATORVASTATIN CALCIUM 80 MG/1
TABLET, FILM COATED ORAL
Qty: 90 TABLET | Refills: 1 | Status: SHIPPED | OUTPATIENT
Start: 2019-11-18 | End: 2020-05-14

## 2019-11-19 NOTE — TELEPHONE ENCOUNTER
Refill passed per [facility] protocol.   Cholesterol Medications  Protocol Criteria:  · Appointment scheduled in the past 12 months or in the next 3 months  · ALT & LDL on file in the past 12 months  · ALT result < 80  · LDL result <130   Recent Outpat

## 2019-11-20 ENCOUNTER — APPOINTMENT (OUTPATIENT)
Dept: CT IMAGING | Age: 54
End: 2019-11-20
Attending: EMERGENCY MEDICINE
Payer: MEDICARE

## 2019-11-20 ENCOUNTER — HOSPITAL ENCOUNTER (OUTPATIENT)
Age: 54
Discharge: HOME | End: 2019-11-20
Attending: EMERGENCY MEDICINE
Payer: MEDICARE

## 2019-11-20 VITALS
RESPIRATION RATE: 16 BRPM | DIASTOLIC BLOOD PRESSURE: 81 MMHG | TEMPERATURE: 98 F | OXYGEN SATURATION: 100 % | WEIGHT: 200 LBS | BODY MASS INDEX: 33.32 KG/M2 | HEART RATE: 60 BPM | SYSTOLIC BLOOD PRESSURE: 144 MMHG | HEIGHT: 65 IN

## 2019-11-20 DIAGNOSIS — R00.1 SINUS BRADYCARDIA: ICD-10-CM

## 2019-11-20 DIAGNOSIS — R42 VERTIGO: Primary | ICD-10-CM

## 2019-11-20 PROCEDURE — 70450 CT HEAD/BRAIN W/O DYE: CPT | Performed by: EMERGENCY MEDICINE

## 2019-11-20 PROCEDURE — 99215 OFFICE O/P EST HI 40 MIN: CPT

## 2019-11-20 PROCEDURE — 93005 ELECTROCARDIOGRAM TRACING: CPT

## 2019-11-20 PROCEDURE — 85025 COMPLETE CBC W/AUTO DIFF WBC: CPT | Performed by: EMERGENCY MEDICINE

## 2019-11-20 PROCEDURE — 36415 COLL VENOUS BLD VENIPUNCTURE: CPT

## 2019-11-20 PROCEDURE — 93010 ELECTROCARDIOGRAM REPORT: CPT | Performed by: EMERGENCY MEDICINE

## 2019-11-20 PROCEDURE — 93010 ELECTROCARDIOGRAM REPORT: CPT

## 2019-11-20 RX ORDER — MECLIZINE HCL 12.5 MG/1
25 TABLET ORAL 3 TIMES DAILY PRN
Qty: 20 TABLET | Refills: 0 | Status: SHIPPED | OUTPATIENT
Start: 2019-11-20 | End: 2019-12-13

## 2019-11-20 NOTE — ED PROVIDER NOTES
Patient Seen in: [address]      History   Patient presents with:  Dizziness (neurologic)    Stated Complaint: lightheaded    HPI    The patient is a 53-year-old female with past history of hyperlipidemia, chronic back pr Systems    Positive for stated complaint: lightheaded  Other systems are as noted in HPI. Constitutional and vital signs reviewed. All other systems reviewed and negative except as noted above.     Physical Exam     ED Triage Vitals [11/20/19 3520] patient's symptoms seem more vertiginous than presyncope. Will initiate treatment with Antivert. Patient is not on any medications which would tend to lower her heart rate. Recommend follow-up with her primary MD for any persistent symptoms.     MDM

## 2019-11-20 NOTE — ED INITIAL ASSESSMENT (HPI)
Intermittent dizziness for several days. No recent illness. No fever. Epidural injection a couple weeks ago for back pain. No nausea. C/o headaches. Taking ibuprofen. Neuro intact. + smoker.

## 2019-11-20 NOTE — ED NOTES
Istat Results:    Na - 142  K - 3.8  Cl - 107  Ca - 1.20  CO2 - 25  Glu - 90  BUN - 10  Creat - 0.7  Hct - 44

## 2019-11-25 ENCOUNTER — OFFICE VISIT (OUTPATIENT)
Dept: NEUROLOGY | Facility: CLINIC | Age: 54
End: 2019-11-25
Payer: MEDICARE

## 2019-11-25 ENCOUNTER — TELEPHONE (OUTPATIENT)
Dept: NEUROLOGY | Facility: CLINIC | Age: 54
End: 2019-11-25

## 2019-11-25 VITALS
BODY MASS INDEX: 32.92 KG/M2 | WEIGHT: 200 LBS | HEIGHT: 65.5 IN | SYSTOLIC BLOOD PRESSURE: 150 MMHG | DIASTOLIC BLOOD PRESSURE: 80 MMHG | HEART RATE: 88 BPM | RESPIRATION RATE: 20 BRPM

## 2019-11-25 DIAGNOSIS — E66.3 PATIENT OVERWEIGHT: ICD-10-CM

## 2019-11-25 DIAGNOSIS — M54.16 LUMBAR RADICULOPATHY: Primary | ICD-10-CM

## 2019-11-25 DIAGNOSIS — F41.1 ANXIETY STATE: ICD-10-CM

## 2019-11-25 DIAGNOSIS — F32.0 CURRENT MILD EPISODE OF MAJOR DEPRESSIVE DISORDER WITHOUT PRIOR EPISODE (HCC): ICD-10-CM

## 2019-11-25 DIAGNOSIS — G47.00 INSOMNIA, UNSPECIFIED TYPE: ICD-10-CM

## 2019-11-25 DIAGNOSIS — G89.4 CHRONIC PAIN SYNDROME: ICD-10-CM

## 2019-11-25 PROCEDURE — 99214 OFFICE O/P EST MOD 30 MIN: CPT | Performed by: PHYSICAL MEDICINE & REHABILITATION

## 2019-11-25 RX ORDER — LIDOCAINE AND MENTHOL 40; 40 MG/1; MG/1
1 PATCH TOPICAL DAILY
Qty: 30 PATCH | Refills: 0 | Status: SHIPPED | OUTPATIENT
Start: 2019-11-25 | End: 2019-12-25

## 2019-11-25 NOTE — PROGRESS NOTES
[address]  Progress Note    CHIEF COMPLAINT:  Patient presents with:  Low Back Pain: Patient presents for follow up on : Left L4 and L5 Transforaminal epidural steroid injection done 11/7/19.  States she got SOCIAL HISTORY:   Social History    Occupational History      Not on file    Tobacco Use      Smoking status: Current Every Day Smoker        Packs/day: 1.00        Years: 20.00        Pack years: 20        Types: Cigarettes      Smokeless tobacco: N other systems reviewed and are negative. Pertinent positives and negatives noted in the HPI.         PHYSICAL EXAM:   /80 (BP Location: Right arm, Patient Position: Sitting, Cuff Size: adult)   Pulse 88   Resp 20   Ht 65.5\"   Wt 200 lb (90.7 kg)   BM week post injection follow up. Discharge Instructions were provided as documented in AVS summary. The patient was in agreement with the assessment and plan. All questions were answered. There were no barriers to learning.         Maya Richardson MD

## 2019-11-25 NOTE — TELEPHONE ENCOUNTER
Medicare Online for insurance coverage of  Left L4 & Left L5 TFESIs cpt code [id number], [id number], [id number]. Insurance was verified and procedure is a covered benefit and does not require authorization.   Will inform Nursing

## 2019-11-25 NOTE — TELEPHONE ENCOUNTER
Left message on voice mail to call to schedule injection. To be scheduled at [address] [place] [address]. LOV today.

## 2019-11-26 ENCOUNTER — MED REC SCAN ONLY (OUTPATIENT)
Dept: NEUROLOGY | Facility: CLINIC | Age: 54
End: 2019-11-26

## 2019-12-03 NOTE — TELEPHONE ENCOUNTER
Spoke with patient, notified of injection approved, please advise if injection should be at [facility]iology. Please advise.

## 2019-12-04 NOTE — TELEPHONE ENCOUNTER
Patient has been scheduled for a Left L4 & Left L5 TFESIs on 12/12/19 at the [facility]. Medications and allergies reviewed. Patient informed to hold aspirins, nsaids, blood thinners, multivitamins, vitamin E and fish oils 3-7 days prior to procedure. Patient inf

## 2019-12-12 ENCOUNTER — TELEPHONE (OUTPATIENT)
Dept: NEUROLOGY | Facility: CLINIC | Age: 54
End: 2019-12-12

## 2019-12-12 ENCOUNTER — OFFICE VISIT (OUTPATIENT)
Dept: SURGERY | Facility: CLINIC | Age: 54
End: 2019-12-12
Payer: MEDICARE

## 2019-12-12 DIAGNOSIS — M54.16 LUMBAR RADICULOPATHY: Primary | ICD-10-CM

## 2019-12-12 PROCEDURE — 99153 MOD SED SAME PHYS/QHP EA: CPT | Performed by: PHYSICAL MEDICINE & REHABILITATION

## 2019-12-12 PROCEDURE — 64483 NJX AA&/STRD TFRM EPI L/S 1: CPT | Performed by: PHYSICAL MEDICINE & REHABILITATION

## 2019-12-12 PROCEDURE — 64484 NJX AA&/STRD TFRM EPI L/S EA: CPT | Performed by: PHYSICAL MEDICINE & REHABILITATION

## 2019-12-12 PROCEDURE — 99152 MOD SED SAME PHYS/QHP 5/>YRS: CPT | Performed by: PHYSICAL MEDICINE & REHABILITATION

## 2019-12-12 NOTE — TELEPHONE ENCOUNTER
[employer] Letter received RE: Terocin Patch not covered. Denied as this is an OTC medication. Over-the-Counter medications is an excluded drug category. Letter placed in Dr. Ena Dixon for review.

## 2019-12-13 ENCOUNTER — OFFICE VISIT (OUTPATIENT)
Dept: INTERNAL MEDICINE CLINIC | Facility: CLINIC | Age: 54
End: 2019-12-13
Payer: MEDICARE

## 2019-12-13 VITALS
SYSTOLIC BLOOD PRESSURE: 107 MMHG | BODY MASS INDEX: 33.99 KG/M2 | WEIGHT: 204 LBS | DIASTOLIC BLOOD PRESSURE: 70 MMHG | HEIGHT: 65 IN | HEART RATE: 78 BPM

## 2019-12-13 DIAGNOSIS — M79.605 LUMBAR PAIN WITH RADIATION DOWN LEFT LEG: Primary | ICD-10-CM

## 2019-12-13 DIAGNOSIS — E66.01 OBESITY, MORBID (HCC): ICD-10-CM

## 2019-12-13 DIAGNOSIS — Z23 NEED FOR VACCINATION: ICD-10-CM

## 2019-12-13 DIAGNOSIS — F32.0 MILD SINGLE CURRENT EPISODE OF MAJOR DEPRESSIVE DISORDER (HCC): ICD-10-CM

## 2019-12-13 DIAGNOSIS — M54.50 LUMBAR PAIN WITH RADIATION DOWN LEFT LEG: Primary | ICD-10-CM

## 2019-12-13 PROCEDURE — 99214 OFFICE O/P EST MOD 30 MIN: CPT | Performed by: INTERNAL MEDICINE

## 2019-12-13 PROCEDURE — G0463 HOSPITAL OUTPT CLINIC VISIT: HCPCS | Performed by: INTERNAL MEDICINE

## 2019-12-13 PROCEDURE — G0009 ADMIN PNEUMOCOCCAL VACCINE: HCPCS | Performed by: INTERNAL MEDICINE

## 2019-12-13 PROCEDURE — 90670 PCV13 VACCINE IM: CPT | Performed by: INTERNAL MEDICINE

## 2019-12-13 RX ORDER — MECLIZINE HCL 12.5 MG/1
25 TABLET ORAL 3 TIMES DAILY PRN
Qty: 50 TABLET | Refills: 0 | Status: SHIPPED | OUTPATIENT
Start: 2019-12-13 | End: 2020-01-14

## 2019-12-13 NOTE — PROGRESS NOTES
Emmanuelle Martin is a 62year old female. HPI:     1. Lumbar pain with radiation down left leg    Has been having back pain that travels down her left leg. Went to ER and given steroids, Hydrocodone and cyclobenzaprine.  Has used some pain patche History:   Diagnosis Date   • Hyperlipidemia    • Left wrist fracture    • Lipid screening 10-    per [employer]   • Obesity, unspecified    • Other ill-defined conditions(508.41) 2012    per [employer]:  \"Left wrist/elbow ulnocarpal disease; Management: hours after pain started. Try to do some marco antonio's flexion exercises 3-4 times daily to improve stretching and decrease muscle spasm and pain.  If narcotics have been ordered use them exactly as prescribed and drink excess fluids or  some miralax pow

## 2019-12-16 ENCOUNTER — TELEPHONE (OUTPATIENT)
Dept: NEUROLOGY | Facility: CLINIC | Age: 54
End: 2019-12-16

## 2019-12-16 NOTE — TELEPHONE ENCOUNTER
Patient had injection at [facility] on 12/12/19. Spoke to [facility], do not have patient`s insurance cards. Left message on patient`s voice mail that [address] [place] [address] did not have insurance cards.

## 2019-12-23 ENCOUNTER — TELEPHONE (OUTPATIENT)
Dept: NEUROLOGY | Facility: CLINIC | Age: 54
End: 2019-12-23

## 2019-12-23 NOTE — TELEPHONE ENCOUNTER
Right now she should start PT. She should take 400mg 2 TID, no more than that. t can take a whole week to notice the effect of the dose change. If no better, will need to see a surgeon.

## 2019-12-23 NOTE — TELEPHONE ENCOUNTER
Spoke with patient, had a Left L4 and L5 Transforaminal epidural steroid injection 12/12/19, states she got relief from injection 4 days after, since then c/o left side low back pain, radiating down back of left leg, rated pain an 8/10, took gabapentin 400

## 2019-12-26 NOTE — TELEPHONE ENCOUNTER
Gabapentin HELPS leg cramps and is not known to CAUSE leg cramps. I recommend staying on it and asking her PMD about the leg cramps.  As for a surgeon I recommend Dr. Toya To. Patient Education     Vomiting (Adult)  Vomiting is a common symptom that may be due to different causes. These include gastroenteritis (\"stomach flu\"), food poisoning and gastritis. There are other more serious causes of vomiting which may be hard to diagnose early in the illness. Therefore, it is important to watch for the warning signs listed below.  The main danger from repeated vomiting is dehydration. This is due to excess loss of water and minerals from the body. When this occurs, body fluids must be replaced.  Home care  · If symptoms are severe, rest at home for the next 24 hours.  · Because your symptoms may be from an infection, wash your hands frequently and well, and use alcohol-based  to avoid spreading the infection to others.  · Wash your hands for at least 20 seconds. Hum the happy birthday song twice for the correct length of time.  · Wash your hands after using the toilet, before and after preparing food, before eating food, after changing a diaper, cleaning a wound, caring for a sick person, and blowing your nose, coughing, or sneezing. You should also wash your hands after caring for someone who is sick, touching pet food, or treats, and touching an animal, or animal waste.  · You may use acetaminophen or NSAID medicines like ibuprofen or naproxen to control fever, unless another medicine was prescribed. If you have chronic liver or kidney disease or ever had a stomach ulcer or GI bleeding, talk with your doctor before using these medicines. Aspirin should never be used in anyone under 18 years of age who is ill with a fever. It may cause severe liver damage. Don't use NSAID medicines if you are already taking one for another condition (like arthritis) or are on aspirin (such as for heart disease, or after a stroke)  · Avoid tobacco and alcohol use, which may worsen your symptoms.  · If medicines for vomiting were prescribed, take as directed.  · Once vomiting stops, then follow these  guidelines:  During the first 12 to 24 hours follow the diet below:  · Fruit juices. Apple, grape juice, clear fruit drinks, and electrolyte replacement drinks.  · Beverages. Soft drinks without caffeine; mineral water (plain or flavored), decaffeinated tea and coffee.  · Soups. Clear broth, consommé and bouillon  · Desserts. Plain gelatin, popsicles and fruit juice bars. As you feel better, you may add 6-8 ounces of yogurt per day.  During the next 24 hours you may add the following to the above:  · Hot cereal, plain toast, bread, rolls, crackers  · Plain noodles, rice, mashed potatoes, chicken noodle or rice soup  · Unsweetened canned fruit (avoid pineapple), bananas  · Limit caffeine and chocolate. No spices or seasonings except salt.  During the next 24 hours:  Gradually resume a normal diet, as you feel better and your symptoms lessen.  Follow-up care  Follow up with your healthcare provider, or as advised.  When to seek medical advice  Call your healthcare provider right away if any of these occur:  · Constant right-sided lower abdominal pain or increasing general abdominal pain  · Continued vomiting (unable to keep liquids down) for 24 hours  · Frequent diarrhea (more than 5 times a day); blood (red or black color) or mucus in diarrhea  · Reduced urine output or extreme thirst  · Weakness, dizziness or fainting  · Unusually drowsy or confused  · Fever of 100.4°F (38°C) oral or higher, or as directed  · Yellow color of the eyes or skin  © 2597-8988 [employer]. [address] [place] [address], [place] [address]. All rights reserved. This information is not intended as a substitute for professional medical care. Always follow your healthcare professional's instructions.

## 2019-12-26 NOTE — TELEPHONE ENCOUNTER
Spoke with patient, informed of 's message.  States she already has been taking gabapentin 400 mg, 2 tablets TID and states it has been helping the pain but concerned it is causing increased leg cramps in both legs, states she had cramps before but

## 2020-01-13 NOTE — TELEPHONE ENCOUNTER
Patient states pharmacy sent request order via fax.  Has not heard anything back yet     Meclizine HCl 12.5 MG Oral Tab

## 2020-01-14 RX ORDER — MECLIZINE HCL 12.5 MG/1
25 TABLET ORAL 3 TIMES DAILY PRN
Qty: 50 TABLET | Refills: 1 | Status: SHIPPED | OUTPATIENT
Start: 2020-01-14 | End: 2021-01-16

## 2020-01-14 NOTE — TELEPHONE ENCOUNTER
LORELEI 12-13-19, pls advise, thanks in advance.        Refill Protocol Appointment Criteria  · Appointment scheduled in the past 12 months or in the next 3 months  Recent Outpatient Visits            1 month ago Lumbar pain with radiation down left leg    Elu

## 2020-01-18 ENCOUNTER — HOSPITAL ENCOUNTER (EMERGENCY)
Facility: HOSPITAL | Age: 55
Discharge: HOME | End: 2020-01-18
Attending: EMERGENCY MEDICINE
Payer: MEDICARE

## 2020-01-18 ENCOUNTER — APPOINTMENT (OUTPATIENT)
Dept: GENERAL RADIOLOGY | Facility: HOSPITAL | Age: 55
End: 2020-01-18
Payer: MEDICARE

## 2020-01-18 VITALS
TEMPERATURE: 98 F | WEIGHT: 200 LBS | BODY MASS INDEX: 33.32 KG/M2 | RESPIRATION RATE: 18 BRPM | DIASTOLIC BLOOD PRESSURE: 69 MMHG | OXYGEN SATURATION: 96 % | HEIGHT: 65 IN | HEART RATE: 67 BPM | SYSTOLIC BLOOD PRESSURE: 132 MMHG

## 2020-01-18 DIAGNOSIS — S49.92XA INJURY OF LEFT SHOULDER, INITIAL ENCOUNTER: Primary | ICD-10-CM

## 2020-01-18 PROCEDURE — 99284 EMERGENCY DEPT VISIT MOD MDM: CPT

## 2020-01-18 PROCEDURE — 96372 THER/PROPH/DIAG INJ SC/IM: CPT

## 2020-01-18 PROCEDURE — 73030 X-RAY EXAM OF SHOULDER: CPT

## 2020-01-18 RX ORDER — MELOXICAM 7.5 MG/1
7.5 TABLET ORAL DAILY PRN
Qty: 14 TABLET | Refills: 0 | Status: SHIPPED | OUTPATIENT
Start: 2020-01-18 | End: 2020-02-01

## 2020-01-18 RX ORDER — LIDOCAINE 50 MG/G
1 PATCH TOPICAL EVERY 24 HOURS
Qty: 7 PATCH | Refills: 0 | Status: SHIPPED | OUTPATIENT
Start: 2020-01-18 | End: 2020-01-25

## 2020-01-18 RX ORDER — ORPHENADRINE CITRATE 30 MG/ML
60 INJECTION INTRAMUSCULAR; INTRAVENOUS ONCE
Status: COMPLETED | OUTPATIENT
Start: 2020-01-18 | End: 2020-01-18

## 2020-01-18 RX ORDER — ORPHENADRINE CITRATE 100 MG/1
100 TABLET, EXTENDED RELEASE ORAL 2 TIMES DAILY
Qty: 20 TABLET | Refills: 0 | Status: SHIPPED | OUTPATIENT
Start: 2020-01-18 | End: 2020-01-28

## 2020-01-18 RX ORDER — KETOROLAC TROMETHAMINE 30 MG/ML
60 INJECTION, SOLUTION INTRAMUSCULAR; INTRAVENOUS ONCE
Status: COMPLETED | OUTPATIENT
Start: 2020-01-18 | End: 2020-01-18

## 2020-01-18 NOTE — ED PROVIDER NOTES
Patient Seen in: [facility] AND [facility] Emergency Department    History   Patient presents with:  Fall    Stated Complaint: fall     HPI    57-year-old female with medical history dyslipidemia, diabetes presenting for evaluation of left shoulder pain status pos Disease Mother 66   • Diabetes Mother 66   • Dementia Mother 66   • Cancer Sister 64        Cervical Cancer   • Depression Sister 50   • Other (Other) Brother 46        Toxic Shock Syndrome       Social History    Tobacco Use      Smoking status: Current E abduction/wrist extension. Skin: Skin is warm. Psychiatric: Cooperative. Nursing note and vitals reviewed.         ED Course   Labs Reviewed - No data to display  Xr Shoulder, Complete (min 2 Views), Left (cpt=[id number])    Result Date: 1/18/2020  PROCEDURE re-evaluation      Medications Prescribed:  Discharge Medication List as of 1/18/2020  5:38 PM    START taking these medications    Meloxicam 7.5 MG Oral Tab  Take 1 tablet (7.5 mg total) by mouth daily as needed for Pain (Take with food.  Do not take with

## 2020-01-20 ENCOUNTER — OFFICE VISIT (OUTPATIENT)
Dept: ORTHOPEDICS CLINIC | Facility: CLINIC | Age: 55
End: 2020-01-20
Payer: MEDICARE

## 2020-01-20 VITALS
WEIGHT: 200 LBS | DIASTOLIC BLOOD PRESSURE: 75 MMHG | RESPIRATION RATE: 18 BRPM | HEART RATE: 66 BPM | BODY MASS INDEX: 33.32 KG/M2 | SYSTOLIC BLOOD PRESSURE: 129 MMHG | HEIGHT: 65 IN

## 2020-01-20 DIAGNOSIS — M75.22 BICEPS TENDINITIS OF LEFT SHOULDER: Primary | ICD-10-CM

## 2020-01-20 DIAGNOSIS — M19.012 PRIMARY LOCALIZED OSTEOARTHROSIS OF LEFT SHOULDER: ICD-10-CM

## 2020-01-20 PROCEDURE — 20610 DRAIN/INJ JOINT/BURSA W/O US: CPT | Performed by: ORTHOPAEDIC SURGERY

## 2020-01-20 RX ORDER — TRIAMCINOLONE ACETONIDE 40 MG/ML
40 INJECTION, SUSPENSION INTRA-ARTICULAR; INTRAMUSCULAR ONCE
Status: COMPLETED | OUTPATIENT
Start: 2020-01-20 | End: 2020-01-20

## 2020-01-20 RX ADMIN — TRIAMCINOLONE ACETONIDE 40 MG: 40 INJECTION, SUSPENSION INTRA-ARTICULAR; INTRAMUSCULAR at 16:00:00

## 2020-01-20 NOTE — H&P
NURSING INTAKE COMMENTS: Patient presents with:  Shoulder Pain: left- pt went to ER on 1/18/20 after she fell on ice and had XR      HPI: This 62year old female presents today with complaints of left shoulder pain after a slip and fall on the ice 2 days a TABLET BY MOUTH EVERY NIGHT AT BEDTIME 90 tablet 1   • Diclofenac Sodium 75 MG Oral Tab EC Take 75 mg by mouth 2 (two) times daily.      • GABAPENTIN 400 MG Oral Cap TAKE ONE CAPSULE BY MOUTH FOUR TIMES DAILY 360 capsule 0   • POLYETHYLENE GLYCOL 3350 Oral distress  Head and neck: Atraumatic, normocephalic, PERRLA  Respiratory: Regular, unlabored breathing  GI: Abdomen soft and nondistended  Vascular: 2+ and symmetric pulses  Skin: intact and benign  Neurologic: Bilateral motor strength symmetric and intact Assessment and Plan:  Diagnoses and all orders for this visit:    Biceps tendinitis of left shoulder  -     DRAIN/INJECT LARGE JOINT/BURSA  -     triamcinolone acetonide (KENALOG-40) 40 MG/ML injection 40 mg  -     PHYSICAL THERAPY - INTERNAL    Primar

## 2020-01-20 NOTE — PROCEDURES
Patient identified left shoulder as correct procedure site, this was verified with consent and 2 patient identifiers. A timeout was performed. Anterior skin over the biceps groove at the site of maximum tenderness was prepped with chlorhexidine.  Injection

## 2020-01-20 NOTE — PROGRESS NOTES
Per verbal order from Dr. Shona Lopez, draw up 4ml of 1% lidocaine and 1ml of Kenalog 40 for cortisone injection to left shoulder.  Gustabo Ramos RN

## 2020-02-04 ENCOUNTER — TELEPHONE (OUTPATIENT)
Dept: INTERNAL MEDICINE CLINIC | Facility: CLINIC | Age: 55
End: 2020-02-04

## 2020-02-04 DIAGNOSIS — I70.0 ATHEROSCLEROSIS OF AORTA (HCC): Primary | ICD-10-CM

## 2020-02-04 NOTE — TELEPHONE ENCOUNTER
Dr. Gabriela Alberts, patient is requesting a referral for Dr. Mariam Patel. Referral has been pended, please advise.      Please reply to pool: EM TRIAGE SUPPORT

## 2020-02-04 NOTE — TELEPHONE ENCOUNTER
Patient is requesting a referral to see Dr. Teresa Saleem at [facility]. States this is her heart doctor but her insurance now requires a referral from her PCP.  Please fax referral to: [phone]

## 2020-02-13 ENCOUNTER — TELEPHONE (OUTPATIENT)
Dept: ORTHOPEDICS CLINIC | Facility: CLINIC | Age: 55
End: 2020-02-13

## 2020-02-13 DIAGNOSIS — M75.22 BICEPS TENDINITIS OF LEFT SHOULDER: Primary | ICD-10-CM

## 2020-02-13 NOTE — TELEPHONE ENCOUNTER
Pt is having left shoulder and arm pain. Pt was not given pain medication at last appointment. Pt requesting to speak to the nurse. Can the doctor order mri.   Please call

## 2020-02-13 NOTE — TELEPHONE ENCOUNTER
S/w pt. She states that she is in 20/10 pain. She is not sleeping well. The ED gave an injection of ketorolac and that seemed to help, she was also sent home with a lidocaine patch, meloxicam and Orphenadrine. Did not feel the meloxicam helped.    The ly

## 2020-02-14 NOTE — TELEPHONE ENCOUNTER
Patient is having continued left shoulder pain after a fall and failed to improve with steroid injection.   I recommend an MRI of her left shoulder w/o contrast.

## 2020-02-17 RX ORDER — MELOXICAM 15 MG/1
15 TABLET ORAL DAILY
Qty: 30 TABLET | Refills: 0 | Status: SHIPPED | OUTPATIENT
Start: 2020-02-17 | End: 2021-01-16

## 2020-02-17 NOTE — TELEPHONE ENCOUNTER
Spoke with pt. She had an office visit with Dr. Jessica Brown today (2/17). She would like to proceed with obtaining an MRI, and would like to see if she can take anything as of now for the pain.   Dr. Jessica Brown advised me that we can send in meloxicam in the [facility]

## 2020-02-24 ENCOUNTER — HOSPITAL ENCOUNTER (OUTPATIENT)
Dept: MRI IMAGING | Age: 55
Discharge: HOME | End: 2020-02-24
Attending: ORTHOPAEDIC SURGERY
Payer: MEDICARE

## 2020-02-24 DIAGNOSIS — M75.22 BICEPS TENDINITIS OF LEFT SHOULDER: ICD-10-CM

## 2020-02-24 PROCEDURE — 73221 MRI JOINT UPR EXTREM W/O DYE: CPT | Performed by: ORTHOPAEDIC SURGERY

## 2020-02-27 ENCOUNTER — TELEPHONE (OUTPATIENT)
Dept: ORTHOPEDICS CLINIC | Facility: CLINIC | Age: 55
End: 2020-02-27

## 2020-02-27 NOTE — TELEPHONE ENCOUNTER
Salina Cortez MD  [facility] Clinical Staff             Please let the patient know I reviewed her MRI and she has a rotator cuff tear.  She can come to clinic to discuss further.       Spoke to pt and informed her of Crescencio's message and instructio

## 2020-02-28 ENCOUNTER — OFFICE VISIT (OUTPATIENT)
Dept: NEUROLOGY | Facility: CLINIC | Age: 55
End: 2020-02-28
Payer: MEDICARE

## 2020-02-28 ENCOUNTER — TELEPHONE (OUTPATIENT)
Dept: NEUROLOGY | Facility: CLINIC | Age: 55
End: 2020-02-28

## 2020-02-28 VITALS
HEIGHT: 65 IN | RESPIRATION RATE: 16 BRPM | BODY MASS INDEX: 34.99 KG/M2 | WEIGHT: 210 LBS | SYSTOLIC BLOOD PRESSURE: 130 MMHG | DIASTOLIC BLOOD PRESSURE: 80 MMHG | HEART RATE: 70 BPM

## 2020-02-28 DIAGNOSIS — E66.3 PATIENT OVERWEIGHT: ICD-10-CM

## 2020-02-28 DIAGNOSIS — G47.00 INSOMNIA, UNSPECIFIED TYPE: ICD-10-CM

## 2020-02-28 DIAGNOSIS — F41.1 ANXIETY STATE: ICD-10-CM

## 2020-02-28 DIAGNOSIS — G89.4 CHRONIC PAIN SYNDROME: ICD-10-CM

## 2020-02-28 DIAGNOSIS — F32.0 CURRENT MILD EPISODE OF MAJOR DEPRESSIVE DISORDER WITHOUT PRIOR EPISODE (HCC): ICD-10-CM

## 2020-02-28 DIAGNOSIS — M54.16 LUMBAR RADICULOPATHY: Primary | ICD-10-CM

## 2020-02-28 DIAGNOSIS — S43.422A SPRAIN OF LEFT ROTATOR CUFF CAPSULE, INITIAL ENCOUNTER: ICD-10-CM

## 2020-02-28 PROCEDURE — 99214 OFFICE O/P EST MOD 30 MIN: CPT | Performed by: PHYSICAL MEDICINE & REHABILITATION

## 2020-02-28 PROCEDURE — 20610 DRAIN/INJ JOINT/BURSA W/O US: CPT | Performed by: PHYSICAL MEDICINE & REHABILITATION

## 2020-02-28 NOTE — PROGRESS NOTES
[address]  Progress Note    CHIEF COMPLAINT:  Patient presents with:  Shoulder Pain: Patient presents for left shoulder pain, LOV:12/12/2019.  Patient had a fall on ice 1/18/20, since then has pain on left s Rotator cuff tear 11/2009    per [employer]:  \"Rotator cuff tear (right)   • Smoking addiction    • Type II diabetes mellitus, uncontrolled ([facility] [address] 10/4/2010       SURGICAL HISTORY:  Past Surgical History:   Procedure Laterality Date   • KNEE ARTHROSCOPY Disturbance: admits   Cardiovascular  Chest Pain: admits  Irregular Heartbeat: denies   Gastrointestinal  Bowel Incontinence: denies  Heartburn: denies   Genitourinary  Difficulty Urinating: denies  Bladder Incontinence: denies   Musculoskeletal  Joint Sti EXTERNAL    3. Chronic pain syndrome      4. Current mild episode of major depressive disorder without prior episode ([facility] [address]      5. Anxiety state      6. Patient overweight      7.  Insomnia, unspecified type      No orders of the defined types were placed i

## 2020-02-28 NOTE — TELEPHONE ENCOUNTER
Medicare Online for insurance coverage of Left shoulder injection cpt codes 45333, [id number], . Insurance was verified and procedure is a covered benefit and does not require authorization. Elissa PANCHAL CMA informed.

## 2020-03-02 ENCOUNTER — MED REC SCAN ONLY (OUTPATIENT)
Dept: NEUROLOGY | Facility: CLINIC | Age: 55
End: 2020-03-02

## 2020-03-14 PROBLEM — S43.422A SPRAIN OF LEFT ROTATOR CUFF CAPSULE: Status: ACTIVE | Noted: 2020-03-14

## 2020-03-16 RX ORDER — LIDOCAINE HYDROCHLORIDE 10 MG/ML
4 INJECTION, SOLUTION INFILTRATION; PERINEURAL ONCE
Status: COMPLETED | OUTPATIENT
Start: 2020-02-28 | End: 2020-02-28

## 2020-03-16 RX ORDER — TRIAMCINOLONE ACETONIDE 40 MG/ML
80 INJECTION, SUSPENSION INTRA-ARTICULAR; INTRAMUSCULAR ONCE
Status: COMPLETED | OUTPATIENT
Start: 2020-02-28 | End: 2020-02-28

## 2020-03-25 ENCOUNTER — TELEPHONE (OUTPATIENT)
Dept: CARDIOLOGY | Age: 55
End: 2020-03-25

## 2020-03-30 RX ORDER — DICLOFENAC SODIUM 75 MG/1
TABLET, DELAYED RELEASE ORAL
Qty: 180 TABLET | Refills: 0 | Status: SHIPPED | OUTPATIENT
Start: 2020-03-30 | End: 2020-06-29

## 2020-03-30 RX ORDER — GABAPENTIN 400 MG/1
CAPSULE ORAL
Qty: 360 CAPSULE | Refills: 0 | Status: SHIPPED | OUTPATIENT
Start: 2020-03-30 | End: 2020-06-29

## 2020-03-30 RX ORDER — IBUPROFEN 600 MG/1
TABLET ORAL
Qty: 300 TABLET | Refills: 0 | Status: SHIPPED | OUTPATIENT
Start: 2020-03-30 | End: 2020-06-21

## 2020-04-01 ENCOUNTER — TELEPHONE (OUTPATIENT)
Dept: ORTHOPEDICS CLINIC | Facility: CLINIC | Age: 55
End: 2020-04-01

## 2020-04-01 NOTE — TELEPHONE ENCOUNTER
Patient can have refill of Mobic as long as she knows not to take it with other NSAIDs. I agree with plan for muscle cramping, she should discuss with PCP and can use heat therapy as treatment.

## 2020-04-01 NOTE — TELEPHONE ENCOUNTER
Pt notified per Dr. Khushbu Moise message. Pharm confirmed. She would like to know if Dr. Khushbu Moise would be willing to rx Orphenadrine Citrate ER  as she was rx'd it when she was in the ER and that helped her pain better.  I did advise her to call pcp office r/t c

## 2020-04-01 NOTE — TELEPHONE ENCOUNTER
rc'd fax from [employer] requesting refill of meloxicam #30. However- pt was just rc'd diclofenac and ibuprofen on 3/30 from pcp office.    I called pt and she states she knows not to take ibuprofen and diclofenac or mobic at the same time d/t them being NSA

## 2020-04-02 RX ORDER — ORPHENADRINE CITRATE 100 MG/1
100 TABLET, EXTENDED RELEASE ORAL 2 TIMES DAILY
Qty: 30 TABLET | Refills: 0 | Status: SHIPPED | OUTPATIENT
Start: 2020-04-02 | End: 2020-07-10

## 2020-04-03 NOTE — TELEPHONE ENCOUNTER
Pt notified per Dr. Jaret Fernandez orders. Also reminded pt to f/u w/ pcp office r/t cramping. She verbalized understanding.

## 2020-04-17 ENCOUNTER — HOSPITAL ENCOUNTER (OUTPATIENT)
Age: 55
Discharge: HOME | End: 2020-04-17
Payer: MEDICARE

## 2020-04-17 ENCOUNTER — TELEPHONE (OUTPATIENT)
Dept: ORTHOPEDICS CLINIC | Facility: CLINIC | Age: 55
End: 2020-04-17

## 2020-04-17 VITALS
HEIGHT: 65.5 IN | WEIGHT: 200 LBS | RESPIRATION RATE: 16 BRPM | BODY MASS INDEX: 32.92 KG/M2 | SYSTOLIC BLOOD PRESSURE: 149 MMHG | HEART RATE: 68 BPM | TEMPERATURE: 98 F | DIASTOLIC BLOOD PRESSURE: 86 MMHG | OXYGEN SATURATION: 99 %

## 2020-04-17 DIAGNOSIS — M25.512 CHRONIC LEFT SHOULDER PAIN: Primary | ICD-10-CM

## 2020-04-17 DIAGNOSIS — G89.29 CHRONIC LEFT SHOULDER PAIN: Primary | ICD-10-CM

## 2020-04-17 PROCEDURE — 99214 OFFICE O/P EST MOD 30 MIN: CPT

## 2020-04-17 PROCEDURE — 99213 OFFICE O/P EST LOW 20 MIN: CPT

## 2020-04-17 RX ORDER — ACETAMINOPHEN AND CODEINE PHOSPHATE 300; 30 MG/1; MG/1
1-2 TABLET ORAL EVERY 6 HOURS PRN
Qty: 10 TABLET | Refills: 0 | Status: SHIPPED | OUTPATIENT
Start: 2020-04-17 | End: 2020-06-21

## 2020-04-17 NOTE — TELEPHONE ENCOUNTER
Pt seen on 1/20 for left shoulder and had cortisone injection. Pt states pain returned 4 days ago and is 10/10. She went to [facility] and they gave her tylenol #3 for pain and advised to f/u in office. Pt wants another cortisone injection.    Advised her that

## 2020-04-17 NOTE — TELEPHONE ENCOUNTER
Pt states at urgent care and they wont give her a injection asking for an appt today for shoulder left  please advise

## 2020-04-17 NOTE — ED INITIAL ASSESSMENT (HPI)
PATIENT STATES SHE FELL IN January AND HAS LEFT SHOULDER PAIN SINCE. IN [facility], PATIENT HAD AN MRI DONE. STATES SHE IS TRYING TO AVOID SURGERY.    PATIENT STATES PAIN HAS INCREASED IN THE LAST FEW DAYS, STATES HER ORTHOPEDIST RECOMMEND THAT SHE COME TO T

## 2020-04-17 NOTE — ED PROVIDER NOTES
Patient presents with:  Shoulder Pain      HPI:     Dara Stinson is a 62year old female history of hyperlipidemia, obesity, type 2 diabetes presents with a chief complaint of left shoulder pain.   Patient states she fell in January and has ha insertional supraspinatus medius tendon tear extending from the anterior leading edge the posterior fibers superimposed on mild tendinosis. I did discuss these findings with the patient.   I also discussed with her we do not have any steroid injections her

## 2020-04-20 NOTE — TELEPHONE ENCOUNTER
If there is availability this week, she can see one of the other ortho surgeons. Otherwise, I can see her next week.

## 2020-04-21 ENCOUNTER — OFFICE VISIT (OUTPATIENT)
Dept: ORTHOPEDICS CLINIC | Facility: CLINIC | Age: 55
End: 2020-04-21
Payer: MEDICARE

## 2020-04-21 VITALS — SYSTOLIC BLOOD PRESSURE: 146 MMHG | HEART RATE: 64 BPM | DIASTOLIC BLOOD PRESSURE: 82 MMHG

## 2020-04-21 DIAGNOSIS — E66.09 CLASS 1 OBESITY DUE TO EXCESS CALORIES WITHOUT SERIOUS COMORBIDITY WITH BODY MASS INDEX (BMI) OF 33.0 TO 33.9 IN ADULT: ICD-10-CM

## 2020-04-21 DIAGNOSIS — S46.012D TRAUMATIC TEAR OF LEFT ROTATOR CUFF, UNSPECIFIED TEAR EXTENT, SUBSEQUENT ENCOUNTER: Primary | ICD-10-CM

## 2020-04-21 DIAGNOSIS — M19.012 OSTEOARTHRITIS OF LEFT ACROMIOCLAVICULAR JOINT: ICD-10-CM

## 2020-04-21 DIAGNOSIS — F17.200 SMOKING ADDICTION: ICD-10-CM

## 2020-04-21 DIAGNOSIS — M75.22 BICEPS TENDINITIS OF LEFT SHOULDER: ICD-10-CM

## 2020-04-21 PROCEDURE — 20610 DRAIN/INJ JOINT/BURSA W/O US: CPT | Performed by: ORTHOPAEDIC SURGERY

## 2020-04-21 PROCEDURE — 99213 OFFICE O/P EST LOW 20 MIN: CPT | Performed by: ORTHOPAEDIC SURGERY

## 2020-04-21 PROCEDURE — G0463 HOSPITAL OUTPT CLINIC VISIT: HCPCS | Performed by: ORTHOPAEDIC SURGERY

## 2020-04-21 RX ORDER — TRIAMCINOLONE ACETONIDE 40 MG/ML
40 INJECTION, SUSPENSION INTRA-ARTICULAR; INTRAMUSCULAR ONCE
Status: SHIPPED | OUTPATIENT
Start: 2020-04-21

## 2020-04-21 NOTE — PROGRESS NOTES
NURSING INTAKE COMMENTS: Patient presents with:  Consult: pt had a left shoudler injury in Jan 2020, pt states she fell on black ice, 11/10 pain eft shoulder pain, pt is taking tylenol #3 for pain but did not take any last night,  pt was seen in the immedi FOUR TIMES DAILY 360 capsule 0   • IBUPROFEN 600 MG Oral Tab TAKE 1 TABLET BY MOUTH EVERY 8 HOURS 300 tablet 0   • Meloxicam 15 MG Oral Tab Take 1 tablet (15 mg total) by mouth daily.  30 tablet 0   • Meclizine HCl 12.5 MG Oral Tab Take 2 tablets (25 mg tot or balance disorder  PSYCHE: no depression or anxiety  HEMATOLOGIC: no hx of blood dyscrasia, no Hx DVT/PE  ENDOCRINE: no thyroid or diabetes issues  ALL/ASTHMA: no new hx of severe allergy or asthma    Physical Examination:    /82 (BP Location: [place] GFRNAA 83 09/09/2019    GFRAA 96 09/09/2019        Assessment and Plan:  Diagnoses and all orders for this visit:    Traumatic tear of left rotator cuff, unspecified tear extent, subsequent encounter  -     DRAIN/INJECT LARGE JOINT/BURSA  -     triamcinolo She did not take any anticoagulation. She had no allergies. We filled out the surgical form. Told her we could not pick a date until she obtains medical clearance from Dr. Calos Cantu.   I told her not to do that however until we were able to schedule surg

## 2020-05-06 ENCOUNTER — NURSE TRIAGE (OUTPATIENT)
Dept: INTERNAL MEDICINE CLINIC | Facility: CLINIC | Age: 55
End: 2020-05-06

## 2020-05-06 ENCOUNTER — TELEPHONE (OUTPATIENT)
Dept: INTERNAL MEDICINE CLINIC | Facility: CLINIC | Age: 55
End: 2020-05-06

## 2020-05-06 NOTE — TELEPHONE ENCOUNTER
Action Requested: Summary for Provider     []  Critical Lab, Recommendations Needed  [] Need Additional Advice  []   FYI    []   Need Orders  [] Need Medications Sent to Pharmacy  []  Other     SUMMARY: pt states she has been having intermittent muscle cra

## 2020-05-07 ENCOUNTER — APPOINTMENT (OUTPATIENT)
Dept: LAB | Facility: HOSPITAL | Age: 55
End: 2020-05-07
Attending: INTERNAL MEDICINE
Payer: MEDICARE

## 2020-05-07 ENCOUNTER — OFFICE VISIT (OUTPATIENT)
Dept: INTERNAL MEDICINE CLINIC | Facility: CLINIC | Age: 55
End: 2020-05-07
Payer: MEDICARE

## 2020-05-07 VITALS
DIASTOLIC BLOOD PRESSURE: 84 MMHG | BODY MASS INDEX: 35.16 KG/M2 | WEIGHT: 211 LBS | HEIGHT: 65 IN | HEART RATE: 76 BPM | SYSTOLIC BLOOD PRESSURE: 138 MMHG | RESPIRATION RATE: 16 BRPM

## 2020-05-07 DIAGNOSIS — F32.0 MILD SINGLE CURRENT EPISODE OF MAJOR DEPRESSIVE DISORDER (HCC): ICD-10-CM

## 2020-05-07 DIAGNOSIS — M79.605 LUMBAR PAIN WITH RADIATION DOWN LEFT LEG: ICD-10-CM

## 2020-05-07 DIAGNOSIS — E66.01 OBESITY, MORBID (HCC): ICD-10-CM

## 2020-05-07 DIAGNOSIS — M54.50 LUMBAR PAIN WITH RADIATION DOWN LEFT LEG: ICD-10-CM

## 2020-05-07 DIAGNOSIS — M79.605 LUMBAR PAIN WITH RADIATION DOWN LEFT LEG: Primary | ICD-10-CM

## 2020-05-07 DIAGNOSIS — F17.200 SMOKING ADDICTION: ICD-10-CM

## 2020-05-07 DIAGNOSIS — M54.50 LUMBAR PAIN WITH RADIATION DOWN LEFT LEG: Primary | ICD-10-CM

## 2020-05-07 PROCEDURE — 99214 OFFICE O/P EST MOD 30 MIN: CPT | Performed by: INTERNAL MEDICINE

## 2020-05-07 PROCEDURE — 36415 COLL VENOUS BLD VENIPUNCTURE: CPT

## 2020-05-07 PROCEDURE — G0463 HOSPITAL OUTPT CLINIC VISIT: HCPCS | Performed by: INTERNAL MEDICINE

## 2020-05-07 PROCEDURE — 80053 COMPREHEN METABOLIC PANEL: CPT

## 2020-05-07 PROCEDURE — 83735 ASSAY OF MAGNESIUM: CPT

## 2020-05-07 RX ORDER — NICOTINE 21 MG/24HR
1 PATCH, TRANSDERMAL 24 HOURS TRANSDERMAL EVERY 24 HOURS
Qty: 30 PATCH | Refills: 0 | Status: SHIPPED | OUTPATIENT
Start: 2020-05-07

## 2020-05-07 NOTE — PROGRESS NOTES
Phyllis Flowers is a 62year old female. HPI:     1. Lumbar pain with radiation down left leg    Has been having back pain that travels down both legs. Has used some pain patches and had 2 epidural injections yesterday from Dr Angeles Ramirez.   Pain is c 3 (three) times daily as needed.  50 tablet 1   • ATORVASTATIN 80 MG Oral Tab TAKE 1 TABLET BY MOUTH EVERY NIGHT AT BEDTIME 90 tablet 1   • POLYETHYLENE GLYCOL 3350 Oral Powder MIX 1 CAPFUL WITH 8 OZ OF WATER AND DRINK BY MOUTH ONCE TO TWICE DAILY 527 g 0 1. Lumbar pain with radiation down left leg    Ice your back for the first 24 hours after pain starts. Take ibuprofen 400-600 mg with food 3 times daily for pain relief.  If it improves pain or decreases muscle spasm use moist heat for short 10-15 minute easier. Advised that I am able to help them through whatever means available to help them quit and they should contact me when they have made the decision to stop smoking. Discussed for 3 minutes the benefits of quitting tobacco and how it relates to his pr

## 2020-05-14 RX ORDER — ATORVASTATIN CALCIUM 80 MG/1
TABLET, FILM COATED ORAL
Qty: 90 TABLET | Refills: 1 | Status: SHIPPED | OUTPATIENT
Start: 2020-05-14 | End: 2020-11-11

## 2020-05-28 ENCOUNTER — TELEPHONE (OUTPATIENT)
Dept: ORTHOPEDICS CLINIC | Facility: CLINIC | Age: 55
End: 2020-05-28

## 2020-05-28 NOTE — TELEPHONE ENCOUNTER
S/w pt. Reviewed pre op check list with her. All questions and concerns were answered. Emailed surgery form to Dr. Jose Alberto Vital surgery scheduler to call pt. Pt verbalized understanding.

## 2020-06-02 ENCOUNTER — APPOINTMENT (OUTPATIENT)
Dept: CARDIOLOGY | Age: 55
End: 2020-06-02

## 2020-06-04 ENCOUNTER — TELEPHONE (OUTPATIENT)
Dept: INTERNAL MEDICINE CLINIC | Facility: CLINIC | Age: 55
End: 2020-06-04

## 2020-06-04 NOTE — TELEPHONE ENCOUNTER
Spoke to patient, paged after hours, fever to 103 for 2 days, cough, body aches, was in  in [place] on May 22, 2020, found out recently that several family members there had Covid  infection, I advised patient that she should go to emergency room fo

## 2020-06-06 ENCOUNTER — HOSPITAL ENCOUNTER (EMERGENCY)
Facility: HOSPITAL | Age: 55
Discharge: HOME | End: 2020-06-06
Attending: EMERGENCY MEDICINE
Payer: MEDICARE

## 2020-06-06 VITALS
BODY MASS INDEX: 33.32 KG/M2 | RESPIRATION RATE: 20 BRPM | HEIGHT: 65 IN | HEART RATE: 63 BPM | TEMPERATURE: 98 F | OXYGEN SATURATION: 98 % | DIASTOLIC BLOOD PRESSURE: 88 MMHG | SYSTOLIC BLOOD PRESSURE: 123 MMHG | WEIGHT: 200 LBS

## 2020-06-06 DIAGNOSIS — U07.1 COVID-19 VIRUS INFECTION: Primary | ICD-10-CM

## 2020-06-06 PROCEDURE — 93010 ELECTROCARDIOGRAM REPORT: CPT | Performed by: EMERGENCY MEDICINE

## 2020-06-06 PROCEDURE — 99284 EMERGENCY DEPT VISIT MOD MDM: CPT

## 2020-06-06 PROCEDURE — 93005 ELECTROCARDIOGRAM TRACING: CPT

## 2020-06-06 NOTE — ED NOTES
At time of discharge, patient aao x4, speech clear, respirations regular and nonlabored. Patient able to speak in full and complete sentences. Patient able to dress self and ambulate unassisted with steady gait.  She verbalizes understanding of discharge in

## 2020-06-06 NOTE — ED PROVIDER NOTES
Patient Seen in: [facility] AND [facility] Emergency Department    History   Patient presents with:  Dyspnea JOSEPH SOB      HPI    Patient presents to the ED complaining of a fever, cough and shortness of breath for the past 3 days.   Had some mild weakness for the Highest education level: Not on file    Tobacco Use      Smoking status: Current Every Day Smoker        Packs/day: 1.00        Years: 20.00        Pack years: 20        Types: Cigarettes      Smokeless tobacco: Never Used    Substance and Sexual Activity time.   Skin: Skin is warm and dry. Psychiatric: She has a normal mood and affect. Her behavior is normal.   Nursing note and vitals reviewed.       ED Course        Labs Reviewed   RAPID SARS-COV-2 BY PCR - Abnormal; Notable for the following components: department: Stable    Disposition and Plan     Clinical Impression:  COVID-19 virus infection  (primary encounter diagnosis)    Disposition:  Discharge    Follow-up:  Inna Ricks MD  [address] [address]  [phone]    Call

## 2020-06-06 NOTE — ED NOTES
Patient reports URI symptoms + fever for 3 days. States she is SOB however she is able to speak in full and complete sentences and does not appear in any distress at this time. She is on continuous pulse oximetry.  States she attended a family  over

## 2020-06-06 NOTE — ED INITIAL ASSESSMENT (HPI)
Pt came in for Fever, cough, SOB for 3 days. Sent in for COVID rule out after being exposed at a . RR shallow and labored with exertion, speaking in full sentences, ambulatory with steady gait. Mask on.

## 2020-06-08 ENCOUNTER — PATIENT OUTREACH (OUTPATIENT)
Dept: CASE MANAGEMENT | Age: 55
End: 2020-06-08

## 2020-06-08 NOTE — PROGRESS NOTES
Home Monitoring Condition Update    Covid19+ test date: 6/6/2020      Consent Verification:  Assessment Completed With: Patient  HIPAA Verified?   Yes    COVID-19 HOME MONITORING 6/8/2020   Temperature 97.3   Reading From Mouth   How are you feeling exhau distance.   o Do you need assistance or are you concerned with obtaining any food, medications, etc? NO       Patient  Instructions:    • Nurse reviewed symptom management with patient:    • Reviewed instructions for self isolation   • Reviewed appropriate

## 2020-06-09 ENCOUNTER — PATIENT OUTREACH (OUTPATIENT)
Dept: CASE MANAGEMENT | Age: 55
End: 2020-06-09

## 2020-06-09 DIAGNOSIS — U07.1 COVID-19: ICD-10-CM

## 2020-06-09 PROCEDURE — E0445 OXIMETER NON-INVASIVE: HCPCS

## 2020-06-09 NOTE — PROGRESS NOTES
Home Monitoring Condition Update    Covid19+ test date: 6/6/20  Contact date: 6/9/20      Consent Verification:  Assessment Completed With: Patient  HIPAA Verified?   Yes    COVID-19 HOME MONITORING 6/9/2020   Temperature 98   Reading From Mouth   Pulse 6 SOB/difficulty breathing or feeling faint go to ER or call 911. Patient verbalized understanding. Message sent to RN to obtain pulse oximeter.     Future Appointments   Date Time Provider Tiffanie Matson   6/10/2020  9:40 AM Jennie Mabry MD DEPARTMENT OF [facility] [place]

## 2020-06-09 NOTE — PROGRESS NOTES
Spoke to pt, confirmed need for pulse ox. Pt will send spouse for  at [facility]. Pt advised insurance will be billed for it but should be covered under the new CARES act. Pt stated she understands and is agreeable.

## 2020-06-10 ENCOUNTER — VIRTUAL PHONE E/M (OUTPATIENT)
Dept: INTERNAL MEDICINE CLINIC | Facility: CLINIC | Age: 55
End: 2020-06-10
Payer: MEDICARE

## 2020-06-10 DIAGNOSIS — U07.1 COVID-19: Primary | ICD-10-CM

## 2020-06-10 PROCEDURE — 99442 PHONE E/M BY PHYS 11-20 MIN: CPT | Performed by: INTERNAL MEDICINE

## 2020-06-10 NOTE — PROGRESS NOTES
Patient ID: Francisco Javier Bello is a 62year old female. 1. COVID-19    Patient developed COVID-19 with symptoms of a high fever and somewhat of a cough. It was hard to determine whether the cough was related as the patient is a chronic smoker. SURGERY UNLISTED Right 11/2009    per [employer]:  \"Open surgery/repair\" (of rotator cuff tear (right))         Current Outpatient Medications:   •  atorvastatin 80 MG Oral Tab, TAKE 1 TABLET BY MOUTH AT BEDTIME, Disp: 90 tablet, Rfl: 1  •  nicotine 21 MG/2 Lifestyle      Physical activity:        Days per week: Not on file        Minutes per session: Not on file      Stress: Not on file    Relationships      Social connections:        Talks on phone: Not on file        Gets together: Not on file        Atten significantly and the temperature is gone. She was diagnosed on June 6 and will be in quarantined until the 20th.   This was discussed in detail with her and she should be wearing a mask and gloves at all times while in the home isolating herself from othe

## 2020-06-11 ENCOUNTER — TELEPHONE (OUTPATIENT)
Dept: CASE MANAGEMENT | Age: 55
End: 2020-06-11

## 2020-06-11 ENCOUNTER — PATIENT OUTREACH (OUTPATIENT)
Dept: CASE MANAGEMENT | Age: 55
End: 2020-06-11

## 2020-06-11 NOTE — PROGRESS NOTES
Home Monitoring Condition Update    Covid19+ test date: 6/6/20  Contact date: 6/11/20      Consent Verification:  Assessment Completed With: Patient  HIPAA Verified?   Yes    COVID-19 HOME MONITORING 6/11/2020   Temperature 97   Reading From Mouth   SPO2 phone. O2 sat 97%. Recommended deep breathing activities. Patient advised to inform their Employee Health department or Manager when they have tested positive for COVID-19.       The patient was also directed to continue to isolate away from other househo

## 2020-06-11 NOTE — TELEPHONE ENCOUNTER
Patient had COVID follow up 6/10    Please advise if okay to discharge from [facility] home monitoring program or if you would like to continue.     If you would like to continue please advise when you would like patient to follow up next and frequency of monito

## 2020-06-12 ENCOUNTER — PATIENT OUTREACH (OUTPATIENT)
Dept: CASE MANAGEMENT | Age: 55
End: 2020-06-12

## 2020-06-12 NOTE — PROGRESS NOTES
Home Monitoring Condition Update    Covid19+ test date: 6/6/20  Contact date: 6/12/20      Consent Verification:  Assessment Completed With: Patient  HIPAA Verified?   Yes    COVID-19 HOME MONITORING 6/12/2020   Temperature 97.4   Reading From Mouth   SPO COVID-19. The patient was also directed to continue to isolate away from other household members when possible and stay completely isolated from the general public 3 days after symptoms resolve or 10 days total (whichever is longer).   Advised patient If

## 2020-06-13 ENCOUNTER — PATIENT OUTREACH (OUTPATIENT)
Dept: CASE MANAGEMENT | Age: 55
End: 2020-06-13

## 2020-06-15 ENCOUNTER — TELEPHONE (OUTPATIENT)
Dept: INTERNAL MEDICINE CLINIC | Facility: CLINIC | Age: 55
End: 2020-06-15

## 2020-06-15 ENCOUNTER — TELEPHONE (OUTPATIENT)
Dept: CASE MANAGEMENT | Age: 55
End: 2020-06-15

## 2020-06-15 ENCOUNTER — PATIENT OUTREACH (OUTPATIENT)
Dept: CASE MANAGEMENT | Age: 55
End: 2020-06-15

## 2020-06-15 RX ORDER — ALBUTEROL SULFATE 90 UG/1
AEROSOL, METERED RESPIRATORY (INHALATION)
Qty: 25.5 G | Refills: 0 | OUTPATIENT
Start: 2020-06-15

## 2020-06-15 RX ORDER — ALBUTEROL SULFATE 90 UG/1
2 AEROSOL, METERED RESPIRATORY (INHALATION) EVERY 6 HOURS PRN
Qty: 1 INHALER | Refills: 0 | Status: SHIPPED | OUTPATIENT
Start: 2020-06-15 | End: 2020-06-26

## 2020-06-15 NOTE — TELEPHONE ENCOUNTER
Called patient for day 6 COVID home monitoring  T: 97.3 Mouth   O2: 98% room air   HR: 66  Pt states she had a hard weekend. Patient feeling better since then. Patient had bouts of diarrhea and vomiting over the weekend. Vomiting has resolved.  Still having

## 2020-06-15 NOTE — TELEPHONE ENCOUNTER
Prior auth requested please go[email or web address]  Key: [id number]    Current Outpatient Medications:   •  Albuterol Sulfate  (90 Base) MCG/ACT Inhalation Aero Soln, Inhale 2 puffs into the lungs every 6 (six) hours as needed for Wheezing or Shortness of

## 2020-06-15 NOTE — PROGRESS NOTES
Home Monitoring Condition Update    Covid19+ test date: 6/6/20  Contact date: 6/15/20      Consent Verification:  Assessment Completed With: Patient  HIPAA Verified?   Yes    COVID-19 HOME MONITORING 6/15/2020   Temperature 97.3   Reading From Mouth   SPO better since then. Patient had bouts of diarrhea and vomiting over the weekend. Vomiting has resolved. Still having diarrhea after eating. Patient following BRAT diet and pushing fluids. Patient has complaints of SOB and dizziness with activity.  Patient st

## 2020-06-16 ENCOUNTER — PATIENT OUTREACH (OUTPATIENT)
Dept: CASE MANAGEMENT | Age: 55
End: 2020-06-16

## 2020-06-16 NOTE — PROGRESS NOTES
Home Monitoring Condition Update    Covid19+ test date: 6/6/20  Contact date: 6/16/20      Consent Verification:  Assessment Completed With: Patient  HIPAA Verified?   Yes    COVID-19 HOME MONITORING 6/16/2020   Temperature 97.3   Reading From Mouth   SPO check temperature when chills arise and take tylenol as needed for fever. Patient advised to take tylenol as needed for symptoms. Patient instructed to follow manufacturers instructions and not to exceed 3 grams per day. Patient verbalizes understanding.

## 2020-06-16 NOTE — TELEPHONE ENCOUNTER
Spoke to pharmacy who states medication was processed under Ventolin HFA since generic is not covered. No prior authorization is needed.

## 2020-06-17 ENCOUNTER — PATIENT OUTREACH (OUTPATIENT)
Dept: CASE MANAGEMENT | Age: 55
End: 2020-06-17

## 2020-06-17 NOTE — PROGRESS NOTES
Home Monitoring Condition Update    Covid19+ test date: 6-6-20      Consent Verification:  Assessment Completed With: Patient  HIPAA Verified?   Yes    COVID-19 HOME MONITORING 6/17/2020   Temperature 97.3   Reading From Mouth   SPO2 98   Pulse 65   Pulse Patient states she is feeling a little better but still has a few symptoms. Had the chills/sweats last night but not today. Advised patient to monitor temp and HR twice/day for trends, get plenty of rest and push fluids. She verbalized understanding.  Pa

## 2020-06-18 ENCOUNTER — PATIENT OUTREACH (OUTPATIENT)
Dept: CASE MANAGEMENT | Age: 55
End: 2020-06-18

## 2020-06-18 NOTE — PROGRESS NOTES
Home Monitoring Condition Update      Consent Verification:  Assessment Completed With: Patient  HIPAA Verified?   Yes    COVID-19 HOME MONITORING 6/18/2020   Temperature 98   Reading From Mouth   SPO2 94   Pulse 65   Pulse taken from Pulse Oximeter   Exsharri longer). Advised patient If symptoms worsen/ medical emergency to call 911.       Time spent this encounter reviewing chart, speaking with patient, gathering resources  Total Time: 10  minutes

## 2020-06-19 ENCOUNTER — TELEPHONE (OUTPATIENT)
Dept: CASE MANAGEMENT | Age: 55
End: 2020-06-19

## 2020-06-19 ENCOUNTER — VIRTUAL PHONE E/M (OUTPATIENT)
Dept: INTERNAL MEDICINE CLINIC | Facility: CLINIC | Age: 55
End: 2020-06-19
Payer: MEDICARE

## 2020-06-19 DIAGNOSIS — F32.9 REACTIVE DEPRESSION: ICD-10-CM

## 2020-06-19 DIAGNOSIS — G89.29 CHRONIC LEFT SHOULDER PAIN: ICD-10-CM

## 2020-06-19 DIAGNOSIS — M25.512 CHRONIC LEFT SHOULDER PAIN: ICD-10-CM

## 2020-06-19 DIAGNOSIS — U07.1 COVID-19: Primary | ICD-10-CM

## 2020-06-19 PROCEDURE — 99442 PHONE E/M BY PHYS 11-20 MIN: CPT | Performed by: INTERNAL MEDICINE

## 2020-06-19 NOTE — TELEPHONE ENCOUNTER
Patient had COVID f/u today. Please advise if okay to discharge from [facility] home monitoring program or if you would like to continue. If you would like to continue, please specify frequency of monitoring and when next follow up should be.     Please re

## 2020-06-19 NOTE — PROGRESS NOTES
Patient ID: Jessica Funk is a 62year old female. Virtual/Telephone Check-In    Paulodionna Claudio verbally consents to a Virtual/Telephone Check-In service on 06/19/20.  Patient understands and accepts financial responsibility for TABLET BY MOUTH TWICE DAILY WITH FOOD, Disp: 180 tablet, Rfl: 0  •  IBUPROFEN 600 MG Oral Tab, TAKE 1 TABLET BY MOUTH EVERY 8 HOURS, Disp: 300 tablet, Rfl: 0  •  Meloxicam 15 MG Oral Tab, Take 1 tablet (15 mg total) by mouth daily. , Disp: 30 tablet, Rfl: 0 Forced sexual activity: Not on file    Other Topics      Concerns:         Service: Not Asked        Blood Transfusions: Not Asked        Caffeine Concern: Yes          (Coffee, Soda) 3 cups daily        Occupational Exposure: Not Asked preoperative testing in the next week or 2 and will need COVID testing as well since she is recovering from this illness.   Currently she is feeling better and has less cough than she had previously her saturation on the oxygen of her blood was 99% this mor

## 2020-06-19 NOTE — TELEPHONE ENCOUNTER
Pt states she just had virtual visit with Dr Nikki Chun and forgot to tell him she needs a refill for Tyl#3 for shoulder pain and Ibuprofen 600 mg. Advised pt per MD note.   Ibuprofen 400 mg recommended and Tyl  mg 1 tab every 6 hours- up to 4 table

## 2020-06-20 ENCOUNTER — PATIENT OUTREACH (OUTPATIENT)
Dept: CASE MANAGEMENT | Age: 55
End: 2020-06-20

## 2020-06-20 NOTE — PROGRESS NOTES
Home Monitoring Condition Update    Covid19+ test date: 6/6/20  Contact date: 6/20/20      Consent Verification:  Assessment Completed With: Patient  HIPAA Verified?   Yes    COVID-19 HOME MONITORING 6/20/2020   Temperature 97.3   Reading From Forehead also informed we will call Monday to get a condition update. Patient advised to inform their Employee Health department or Manager when they have tested positive for COVID-19.       The patient was also directed to continue to isolate away from other house

## 2020-06-21 RX ORDER — IBUPROFEN 600 MG/1
600 TABLET ORAL EVERY 8 HOURS
Qty: 300 TABLET | Refills: 0 | Status: SHIPPED | OUTPATIENT
Start: 2020-06-21 | End: 2021-01-16

## 2020-06-21 RX ORDER — ACETAMINOPHEN AND CODEINE PHOSPHATE 300; 30 MG/1; MG/1
1-2 TABLET ORAL EVERY 6 HOURS PRN
Qty: 10 TABLET | Refills: 0 | Status: SHIPPED | OUTPATIENT
Start: 2020-06-21 | End: 2020-06-22

## 2020-06-22 ENCOUNTER — TELEPHONE (OUTPATIENT)
Dept: INTERNAL MEDICINE CLINIC | Facility: CLINIC | Age: 55
End: 2020-06-22

## 2020-06-22 ENCOUNTER — PATIENT OUTREACH (OUTPATIENT)
Dept: CASE MANAGEMENT | Age: 55
End: 2020-06-22

## 2020-06-22 ENCOUNTER — TELEPHONE (OUTPATIENT)
Dept: CASE MANAGEMENT | Age: 55
End: 2020-06-22

## 2020-06-22 RX ORDER — ACETAMINOPHEN AND CODEINE PHOSPHATE 300; 30 MG/1; MG/1
TABLET ORAL
Qty: 10 TABLET | Refills: 0 | Status: SHIPPED | OUTPATIENT
Start: 2020-06-22 | End: 2020-07-08

## 2020-06-22 NOTE — TELEPHONE ENCOUNTER
Spoke with Lashanda Sanchez RN from [facility] Dept, pt  verified, she stated she was trying to get hold of pt due to positive covid but was unable. RN wants to confirm pt tel #, she has [phone], also [phone]. Pt tel # confirmed.      PETE

## 2020-06-22 NOTE — PROGRESS NOTES
Home Monitoring Condition Update    Covid19+ test date: 6/6/20  Contact date: 6/22/20      Consent Verification:  Assessment Completed With: Patient  HIPAA Verified?   Yes    COVID-19 HOME MONITORING 6/22/2020   Temperature 97.5   Reading From Forehead Patient able to speak comfortably in full sentences. Patient did not sound SOB during call. Patient denies difficulty breathing, dizziness/feeling faint.  Patient advised to go to ER if difficulty breathing develops, feeling dizzy or faint. - TE sent to PCP

## 2020-06-22 NOTE — TELEPHONE ENCOUNTER
Called patient for day 11 COVID home monitoring    Patient states she felt a little bit SOB this morning while laying down and with some activity. Patient states she is going through a lot with her sister in the hospital right.  She thinks it may be related

## 2020-06-23 ENCOUNTER — APPOINTMENT (OUTPATIENT)
Dept: CARDIOLOGY | Age: 55
End: 2020-06-23

## 2020-06-23 ENCOUNTER — PATIENT OUTREACH (OUTPATIENT)
Dept: CASE MANAGEMENT | Age: 55
End: 2020-06-23

## 2020-06-23 NOTE — TELEPHONE ENCOUNTER
Patient was ONLY tested 6/06/2020    Please advise on when to schedule/need for virtual visit    We will monitor patient until 6/26/2020    Do you want virtual visit 6/29/2020 or just condition update sent to you after speaking with patient 6/26/2020?     P

## 2020-06-23 NOTE — TELEPHONE ENCOUNTER
Spoke with patient for day #12 Covid Home Monitoring:    Temp: 97.3  HR: 60  O2 sat: 94%    Patient reports around 5-6 p.m. yesterday, \"I got the chills, then I was sweating so bad. I checked my temperature then, but I didn't have a fever.  I took some Tyl

## 2020-06-23 NOTE — PROGRESS NOTES
Home Monitoring Condition Update    Covid19+ test date: 6/06/2020      Consent Verification:  Assessment Completed With: Patient  HIPAA Verified?   Yes    COVID-19 HOME MONITORING 6/23/2020   Temperature 97.3   Reading From Mouth   SPO2 -   Pulse 60   Pul to continue monitoring temperature and pulse at least twice a day and record, rest, stay hydrated, BRAT diet with small, frequent meals.  Advised to take Tylenol q 6 hrs prn fever/pain, not to exceed 3000 mg/day--to call back with worsening symptoms--brad

## 2020-06-23 NOTE — TELEPHONE ENCOUNTER
Not sure why patient was tested for covid 19 again as she was positive recently.   She is to continue COVID monitoring through 626 and notify us if she should develop increased cough, shortness of breath, abdominal symptoms, or other symptoms develop

## 2020-06-23 NOTE — PROGRESS NOTES
LMOMTCB for home monitoring day 12 condition update. NCM contact information provided.       Awaiting PCP response from 6/22/2020 condition update

## 2020-06-23 NOTE — TELEPHONE ENCOUNTER
Left message to call back for day #12 Covid Home Monitoring    Please see Lucia's message below form 6/22/2020 and advise    Also advise if patient needs Covid f/u virtual visit    Will continue to monitor patient until Friday, 6/26/2020 per PCP    Please

## 2020-06-24 ENCOUNTER — PATIENT OUTREACH (OUTPATIENT)
Dept: CASE MANAGEMENT | Age: 55
End: 2020-06-24

## 2020-06-24 NOTE — PROGRESS NOTES
Home Monitoring Condition Update    Covid19+ test date: 6/06/2020      Consent Verification:  Assessment Completed With: Patient  HIPAA Verified?   Yes    COVID-19 HOME MONITORING 6/24/2020   Temperature 97.3   Reading From Mouth   SPO2 95   Pulse 60   Pu to continue monitoring temperature and pulse at least twice a day and record, rest, stay hydrated, BRAT diet with small, frequent meals.  Advised to take Tylenol q 6 hrs prn fever/pain, not to exceed 3000 mg/day--to call back with worsening symptoms--brad

## 2020-06-25 ENCOUNTER — PATIENT OUTREACH (OUTPATIENT)
Dept: CASE MANAGEMENT | Age: 55
End: 2020-06-25

## 2020-06-25 NOTE — PROGRESS NOTES
Home Monitoring Condition Update    Covid19+ test date: 6/06/2020      Consent Verification:  Assessment Completed With: Patient  HIPAA Verified?   Yes    COVID-19 HOME MONITORING 6/25/2020   Temperature 97.3   Reading From Mouth   SPO2 94   Pulse 69   Pu monitoring temperature and pulse at least twice a day and record, rest, stay hydrated, BRAT diet with small, frequent meals.  Advised to take Tylenol q 6 hrs prn fever/pain, not to exceed 3000 mg/day--to call back with worsening symptoms--patient verbalizes

## 2020-06-26 ENCOUNTER — TELEPHONE (OUTPATIENT)
Dept: INTERNAL MEDICINE CLINIC | Facility: CLINIC | Age: 55
End: 2020-06-26

## 2020-06-26 ENCOUNTER — TELEPHONE (OUTPATIENT)
Dept: CASE MANAGEMENT | Age: 55
End: 2020-06-26

## 2020-06-26 ENCOUNTER — PATIENT OUTREACH (OUTPATIENT)
Dept: CASE MANAGEMENT | Age: 55
End: 2020-06-26

## 2020-06-26 RX ORDER — ALBUTEROL SULFATE 90 UG/1
2 AEROSOL, METERED RESPIRATORY (INHALATION) EVERY 6 HOURS PRN
Qty: 1 INHALER | Refills: 3 | Status: SHIPPED | OUTPATIENT
Start: 2020-06-26 | End: 2021-03-17

## 2020-06-26 NOTE — TELEPHONE ENCOUNTER
Spoke with patient ( verified) and relayed Dr. Bernie Sparrow's and PADMINI Portillo's message below--patient verbalizes understanding and agreement. Home Monitoring will call patient for f/u 2020. No further questions/concerns at this time.

## 2020-06-26 NOTE — TELEPHONE ENCOUNTER
Prior authorization  Requested      Current Outpatient Medications:   •  Albuterol Sulfate  (90 Base) MCG/ACT Inhalation Aero Soln, Inhale 2 puffs into the lungs every 6 (six) hours as needed for Wheezing or Shortness of Breath., Disp: 1 Inhaler, Rf

## 2020-06-26 NOTE — TELEPHONE ENCOUNTER
Spoke with patient for day #15 Covid Home Monitoring (test date 6/06/2020):    Temp: 99.1 (oral)  Pulse: 70  O2 sat: 94%      \"I'm exhausted--I had some burning-some stretching in my chest yesterday and this morning.  My sister is still in ICU after outpat

## 2020-06-26 NOTE — TELEPHONE ENCOUNTER
Please advise if patient can be removed from home monitoring program or needs continued monitoring (freq/duration)    Also advise on recommendations RE: upcoming shoulder surgery    Please see below    Please respond to [address]

## 2020-06-26 NOTE — PROGRESS NOTES
Home Monitoring Condition Update    Covid19+ test date: 6/06/2020      Consent Verification:  Assessment Completed With: Patient  HIPAA Verified?   Yes    COVID-19 HOME MONITORING 6/26/2020   Temperature 99.1   Reading From Mouth   SPO2 94   Pulse 70   Pu reviewed symptom management with patient:    • Reviewed instructions for self isolation   • Reviewed appropriate contacts for questions/concerns regarding patient status       Nurse Interventions:      The patient was advised one of the nurses on our team w

## 2020-06-26 NOTE — TELEPHONE ENCOUNTER
Use of nebulizer is not recommended in pts that have covid, inhaler preferred .  Continue monitoring q 3 days, I advise to hold off on the shoulder surgery

## 2020-06-29 ENCOUNTER — TELEPHONE (OUTPATIENT)
Dept: INTERNAL MEDICINE CLINIC | Facility: CLINIC | Age: 55
End: 2020-06-29

## 2020-06-29 ENCOUNTER — PATIENT OUTREACH (OUTPATIENT)
Dept: CASE MANAGEMENT | Age: 55
End: 2020-06-29

## 2020-06-29 RX ORDER — DICLOFENAC SODIUM 75 MG/1
75 TABLET, DELAYED RELEASE ORAL 2 TIMES DAILY WITH MEALS
Qty: 180 TABLET | Refills: 0 | Status: SHIPPED | OUTPATIENT
Start: 2020-06-29 | End: 2021-01-16

## 2020-06-29 RX ORDER — GABAPENTIN 400 MG/1
400 CAPSULE ORAL 4 TIMES DAILY
Qty: 360 CAPSULE | Refills: 1 | Status: SHIPPED | OUTPATIENT
Start: 2020-06-29 | End: 2021-01-16

## 2020-06-29 NOTE — TELEPHONE ENCOUNTER
Spoke to pharmacist who states Ventolin has been stored on file since patient does not need the medication filled. No prior authorization is needed for Ventolin.

## 2020-06-29 NOTE — TELEPHONE ENCOUNTER
90 day supply requested.       Current Outpatient Medications:   •  GABAPENTIN 400 MG Oral Cap, TAKE ONE CAPSULE BY MOUTH FOUR TIMES DAILY, Disp: 360 capsule, Rfl: 0  •  DICLOFENAC SODIUM 75 MG Oral Tab EC, TAKE 1 TABLET BY MOUTH TWICE DAILY WITH FOOD, Yadira Navarro

## 2020-06-29 NOTE — PROGRESS NOTES
Home Monitoring Condition Update        Consent Verification:  Assessment Completed With: Patient  HIPAA Verified?   Yes    COVID-19 HOME MONITORING 6/29/2020   Temperature 98.7   Reading From Mouth   SPO2 94   Pulse 60   Pulse taken from Pulse Oximeter possible and stay completely isolated from the general public 3 days after symptoms resolve or 10 days total (whichever is longer). Advised patient If symptoms worsen/ medical emergency to call 911.       Time spent this encounter reviewing chart, speaking

## 2020-06-30 ENCOUNTER — TELEPHONE (OUTPATIENT)
Dept: INTERNAL MEDICINE CLINIC | Facility: CLINIC | Age: 55
End: 2020-06-30

## 2020-06-30 NOTE — TELEPHONE ENCOUNTER
PRIOR AUTH REQUEST   [facility] [employer] [phone]    Current Outpatient Medications:   ••  Albuterol Sulfate  (90 Base) MCG/ACT Inhalation Aero Soln, Inhale 2 puffs into the lungs every 6 (six) hours as needed for Wheezing or Shortness of Breath

## 2020-07-01 NOTE — TELEPHONE ENCOUNTER
June 29, 2020   Me   2:29 PM   Note      Spoke to pharmacist who states Ventolin has been stored on file since patient does not need the medication filled.      No prior authorization is needed for Ventolin.

## 2020-07-02 ENCOUNTER — PATIENT OUTREACH (OUTPATIENT)
Dept: CASE MANAGEMENT | Age: 55
End: 2020-07-02

## 2020-07-03 ENCOUNTER — PATIENT OUTREACH (OUTPATIENT)
Dept: CASE MANAGEMENT | Age: 55
End: 2020-07-03

## 2020-07-03 NOTE — PROGRESS NOTES
Home Monitoring Condition Update    Covid19+ test date: 6/6/20      Consent Verification:  Assessment Completed With: Patient  HIPAA Verified?   Yes    COVID-19 HOME MONITORING 7/3/2020   Temperature 98.3   Reading From Mouth   SPO2 97   Pulse 70   Pulse symptoms are about the same, slight improvement. Patient states she looks forward to our calls for updates and she appreciates our time. Scheduled VV for 7/7 with PCP.     Patient advised to inform their Employee Health department or Manager when they have

## 2020-07-06 ENCOUNTER — TELEPHONE (OUTPATIENT)
Dept: ORTHOPEDICS CLINIC | Facility: CLINIC | Age: 55
End: 2020-07-06

## 2020-07-06 NOTE — TELEPHONE ENCOUNTER
pt. states that the nurse hs not called her to sched her surgery. Pt. States that she is still having a lot of pain, and that she has been self quarantined June 6th.

## 2020-07-07 ENCOUNTER — TELEPHONE (OUTPATIENT)
Dept: ORTHOPEDICS CLINIC | Facility: CLINIC | Age: 55
End: 2020-07-07

## 2020-07-07 ENCOUNTER — VIRTUAL PHONE E/M (OUTPATIENT)
Dept: INTERNAL MEDICINE CLINIC | Facility: CLINIC | Age: 55
End: 2020-07-07
Payer: MEDICARE

## 2020-07-07 DIAGNOSIS — F17.200 SMOKING ADDICTION: ICD-10-CM

## 2020-07-07 DIAGNOSIS — F32.9 REACTIVE DEPRESSION: ICD-10-CM

## 2020-07-07 DIAGNOSIS — U07.1 COVID-19: Primary | ICD-10-CM

## 2020-07-07 PROCEDURE — 99442 PHONE E/M BY PHYS 11-20 MIN: CPT | Performed by: INTERNAL MEDICINE

## 2020-07-07 NOTE — TELEPHONE ENCOUNTER
This surgery is elective.  Based on information available at the present time, I think it would be prudent to reschedule her toward late August.

## 2020-07-07 NOTE — TELEPHONE ENCOUNTER
Pt called stating pt has not received a call back. Pt surgery on 7-16-20 has to be reschedule due to pt having the covid 19. Question on what pt can do in the mean time.   Please call

## 2020-07-07 NOTE — TELEPHONE ENCOUNTER
Patient has been in [place] for 1 month after testing positive for covid. Patient asking for plan  when she needs to follow up for an in office visit. Please call at:[phone],thanks.

## 2020-07-07 NOTE — TELEPHONE ENCOUNTER
Dr Neli Garcia, pt requesting refill of orphenadrine. LOV with you was on 01/20/20. Rx'd on 04/02/20.   Pt is scheduled for surgery with Radha on 07/16/20 for left shoulder

## 2020-07-07 NOTE — PROGRESS NOTES
Patient ID: Kashif Benoit is a 62year old female. Virtual/Telephone Check-In    [facility] verbally consents to a Virtual/Telephone Check-In service on 06/19/20.  Patient understands and accepts financial responsibility fo (eight) hours. , Disp: 300 tablet, Rfl: 0  •  atorvastatin 80 MG Oral Tab, TAKE 1 TABLET BY MOUTH AT BEDTIME, Disp: 90 tablet, Rfl: 1  •  nicotine 21 MG/24HR Transdermal Patch 24 Hr, Place 1 patch onto the skin daily. , Disp: 30 patch, Rfl: 0  •  Orphenadrin file        Relationship status: Not on file      Intimate partner violence:        Fear of current or ex partner: Not on file        Emotionally abused: Not on file        Physically abused: Not on file        Forced sexual activity: Not on file    Other Electronic cigarettes can help decrease nicotine intake and make quitting easier. Advised that I am able to help them through whatever means available to help them quit and they should contact me when they have made the decision to stop smoking. Discussed f

## 2020-07-07 NOTE — TELEPHONE ENCOUNTER
S/w pt. She informed me that she needs to reschedule her surgery b/c she was tested positive for COVID. She is requesting for her surgery to be around the beginning of November as recommended by her PCP.   She also wanted to come see Dr. Chapis Will again b/

## 2020-07-08 ENCOUNTER — PATIENT OUTREACH (OUTPATIENT)
Dept: CASE MANAGEMENT | Age: 55
End: 2020-07-08

## 2020-07-08 ENCOUNTER — TELEPHONE (OUTPATIENT)
Dept: INTERNAL MEDICINE CLINIC | Facility: CLINIC | Age: 55
End: 2020-07-08

## 2020-07-08 RX ORDER — ACETAMINOPHEN AND CODEINE PHOSPHATE 300; 30 MG/1; MG/1
TABLET ORAL
Qty: 10 TABLET | Refills: 0 | Status: SHIPPED | OUTPATIENT
Start: 2020-07-08 | End: 2020-12-24

## 2020-07-08 NOTE — TELEPHONE ENCOUNTER
Noted. Will call pt daily until Friday 7/10 and advise she can be off quarantine after this weekend.

## 2020-07-08 NOTE — PROGRESS NOTES
Home Monitoring Condition Update    Covid19+ test date: 6/6/20      Consent Verification:  Assessment Completed With: Patient  HIPAA Verified?   Yes    COVID-19 HOME MONITORING 7/8/2020   Temperature 97.3   Reading From Mouth   SPO2 95   Pulse 65   Pulse and denies any questions or concerns at this time. She mentioned she cancelled her shoulder surgery so she is only having some pain from that, nothing related to COVID-19.     Patient advised to inform their Employee Health department or Manager when they h

## 2020-07-08 NOTE — TELEPHONE ENCOUNTER
Pt seen for virtual visit 7/7 - please advise if pt can be discharged from [address] or if pt should continue to be followed and if so - the frequency and duration of monitoring.      Thank you           Please reply to [address]

## 2020-07-08 NOTE — TELEPHONE ENCOUNTER
Pls see telephone comm 7/6.   Pt stts that PCP would recommend late October/early Nov.  Sent message to surgery scheduler to call pt to reschedule

## 2020-07-09 ENCOUNTER — PATIENT OUTREACH (OUTPATIENT)
Dept: CASE MANAGEMENT | Age: 55
End: 2020-07-09

## 2020-07-09 NOTE — PROGRESS NOTES
Home Monitoring Condition Update    Covid19+ test date: 6/6/20      Consent Verification:  Assessment Completed With: Patient  HIPAA Verified?   Yes    COVID-19 HOME MONITORING 7/9/2020   Temperature 98   Reading From Mouth   SPO2 98   Pulse 75   Pulse ta directed to continue to isolate away from other household members when possible and stay completely isolated from the general public 3 days after symptoms resolve or 10 days total (whichever is longer).   Advised patient If symptoms worsen/ medical emergenc

## 2020-07-10 ENCOUNTER — PATIENT OUTREACH (OUTPATIENT)
Dept: CASE MANAGEMENT | Age: 55
End: 2020-07-10

## 2020-07-10 RX ORDER — ORPHENADRINE CITRATE 100 MG/1
TABLET, EXTENDED RELEASE ORAL
Qty: 30 TABLET | Refills: 0 | Status: SHIPPED | OUTPATIENT
Start: 2020-07-10 | End: 2020-07-21

## 2020-07-14 ENCOUNTER — TELEPHONE (OUTPATIENT)
Dept: INTERNAL MEDICINE CLINIC | Facility: CLINIC | Age: 55
End: 2020-07-14

## 2020-07-14 NOTE — TELEPHONE ENCOUNTER
Spoke with pt,  verified, pt stated today her temp 99.6, O2 sat 95%, MO 70. Pt stated she went out last Friday and Saturday as per Dr. Sheri Lopez advised. Pt temp last Saturday was 99.6, then  99.4 then yesterday 98.1.   Temp now 98.1 ( oral)   P

## 2020-07-21 ENCOUNTER — TELEPHONE (OUTPATIENT)
Dept: ORTHOPEDICS CLINIC | Facility: CLINIC | Age: 55
End: 2020-07-21

## 2020-07-21 ENCOUNTER — OFFICE VISIT (OUTPATIENT)
Dept: ORTHOPEDICS CLINIC | Facility: CLINIC | Age: 55
End: 2020-07-21
Payer: MEDICARE

## 2020-07-21 VITALS
DIASTOLIC BLOOD PRESSURE: 81 MMHG | HEIGHT: 65 IN | BODY MASS INDEX: 33.32 KG/M2 | WEIGHT: 200 LBS | HEART RATE: 67 BPM | SYSTOLIC BLOOD PRESSURE: 159 MMHG

## 2020-07-21 DIAGNOSIS — S46.012D TRAUMATIC TEAR OF LEFT ROTATOR CUFF, UNSPECIFIED TEAR EXTENT, SUBSEQUENT ENCOUNTER: Primary | ICD-10-CM

## 2020-07-21 DIAGNOSIS — M75.22 BICEPS TENDINITIS OF LEFT SHOULDER: ICD-10-CM

## 2020-07-21 DIAGNOSIS — M19.012 OSTEOARTHRITIS OF LEFT ACROMIOCLAVICULAR JOINT: ICD-10-CM

## 2020-07-21 PROCEDURE — 20610 DRAIN/INJ JOINT/BURSA W/O US: CPT | Performed by: ORTHOPAEDIC SURGERY

## 2020-07-21 PROCEDURE — 99213 OFFICE O/P EST LOW 20 MIN: CPT | Performed by: ORTHOPAEDIC SURGERY

## 2020-07-21 PROCEDURE — G0463 HOSPITAL OUTPT CLINIC VISIT: HCPCS | Performed by: ORTHOPAEDIC SURGERY

## 2020-07-21 RX ORDER — TRIAMCINOLONE ACETONIDE 40 MG/ML
40 INJECTION, SUSPENSION INTRA-ARTICULAR; INTRAMUSCULAR ONCE
Status: COMPLETED | OUTPATIENT
Start: 2020-07-21 | End: 2020-07-21

## 2020-07-21 RX ORDER — CYCLOBENZAPRINE HCL 10 MG
5 TABLET ORAL NIGHTLY PRN
Qty: 30 TABLET | Refills: 0 | Status: SHIPPED | OUTPATIENT
Start: 2020-07-21

## 2020-07-21 RX ORDER — ORPHENADRINE CITRATE 100 MG/1
100 TABLET, EXTENDED RELEASE ORAL 2 TIMES DAILY
Qty: 60 TABLET | Refills: 1 | Status: SHIPPED | OUTPATIENT
Start: 2020-07-21 | End: 2020-07-21

## 2020-07-21 RX ORDER — ORPHENADRINE CITRATE 100 MG/1
100 TABLET, EXTENDED RELEASE ORAL 2 TIMES DAILY
Qty: 60 TABLET | Refills: 1 | Status: SHIPPED | OUTPATIENT
Start: 2020-07-21

## 2020-07-21 RX ADMIN — TRIAMCINOLONE ACETONIDE 40 MG: 40 INJECTION, SUSPENSION INTRA-ARTICULAR; INTRAMUSCULAR at 11:45:00

## 2020-07-21 NOTE — PROGRESS NOTES
NURSING INTAKE COMMENTS: Patient presents with:  Shoulder Pain: c/o Left shoulder pain      HPI: This 62year old female presents today for follow-up left shoulder rotator cuff tear.   We had scheduled surgery last week but that was postponed due to her COV Albuterol Sulfate  (90 Base) MCG/ACT Inhalation Aero Soln Inhale 2 puffs into the lungs every 6 (six) hours as needed for Wheezing or Shortness of Breath.  1 Inhaler 3   • ibuprofen 600 MG Oral Tab Take 1 tablet (600 mg total) by mouth every 8 (eight other than in HPI  NEURO: no numbness or tingling, no weakness or balance disorder  PSYCHE: no depression or anxiety  HEMATOLOGIC: no hx of blood dyscrasia, no Hx DVT/PE  ENDOCRINE: no thyroid or diabetes issues  ALL/ASTHMA: no new hx of severe allergy or hallucinating. Assessment: Left shoulder rotator cuff tear for surgery in November 2020. I told her to make a firm surgical date and then see me about 3 weeks prior to that for an examination and possible new MRI.   For today we refilled the orphena

## 2020-07-21 NOTE — TELEPHONE ENCOUNTER
Per Daly, Orphenadrine Citrate  MG Oral Tablet 12 Hr is on backorder, is wanting to know if Dr. Maci Jeronimo can prescribe a different medication.  Please advise

## 2020-07-22 ENCOUNTER — TELEPHONE (OUTPATIENT)
Dept: ORTHOPEDICS CLINIC | Facility: CLINIC | Age: 55
End: 2020-07-22

## 2020-07-22 NOTE — TELEPHONE ENCOUNTER
Per pt received cyclobenzaprine 10 MG Oral Tab from Dr. Tom King, pt is stating this prescription was filled as an error.  Please advise

## 2020-08-19 ENCOUNTER — TELEPHONE (OUTPATIENT)
Dept: CARDIOLOGY | Age: 55
End: 2020-08-19

## 2020-10-21 ENCOUNTER — VIRTUAL PHONE E/M (OUTPATIENT)
Dept: INTERNAL MEDICINE CLINIC | Facility: CLINIC | Age: 55
End: 2020-10-21
Payer: MEDICARE

## 2020-10-21 ENCOUNTER — TELEPHONE (OUTPATIENT)
Dept: INTERNAL MEDICINE CLINIC | Facility: CLINIC | Age: 55
End: 2020-10-21

## 2020-10-21 DIAGNOSIS — R53.82 CHRONIC FATIGUE: Primary | ICD-10-CM

## 2020-10-21 PROCEDURE — 99442 PHONE E/M BY PHYS 11-20 MIN: CPT | Performed by: INTERNAL MEDICINE

## 2020-10-21 NOTE — PROGRESS NOTES
Patient ID: Kreg Felty is a 62year old female. 1. Chronic fatigue    The patient had Covid earlier in the year and was quite fatigued.   She states that this fatigue has continued for the last several months and the patient has no energy spasms. No alcohol or driving on this med.  Stop if lethargic or hallucinating., Disp: 30 tablet, Rfl: 0  •  ACETAMINOPHEN-CODEINE #3 300-30 MG Oral Tab, TAKE 1 TO 2 TABLETS BY MOUTH EVERY 6 HOURS AS NEEDED FOR PAIN, Disp: 10 tablet, Rfl: 0  •  Diclofenac S Packs/day: 1.00        Years: 20.00        Pack years: 20        Types: Cigarettes      Smokeless tobacco: Never Used    Substance and Sexual Activity      Alcohol use: No      Drug use: Never      Sexual activity: Not on file    Lifestyle      Physical illness but the patient is currently not having any fever to go along with her illness at this time.       Time spent on encounter  14 minutes   Telephone time 11 minutes   Documentation time 3 minutes     Nuzhat Overton understands phone Sriram Teresa

## 2020-10-21 NOTE — TELEPHONE ENCOUNTER
Per patient ever since she's had covid 19 she is having body aches all over like someone's hitting her, left side numbness to the outer aspect of the leg knee to ankle. Taking ibuprofen 600 mg, gabapentin. Per patient the pain keeps her up at night.  Schedu

## 2020-11-11 RX ORDER — ATORVASTATIN CALCIUM 80 MG/1
TABLET, FILM COATED ORAL
Qty: 90 TABLET | Refills: 1 | Status: SHIPPED | OUTPATIENT
Start: 2020-11-11 | End: 2021-01-16

## 2020-11-11 NOTE — TELEPHONE ENCOUNTER
Current Outpatient Medications:   •  atorvastatin 80 MG Oral Tab, TAKE 1 TABLET BY MOUTH AT BEDTIME, Disp: 90 tablet, Rfl: 1  •

## 2020-11-18 ENCOUNTER — HOSPITAL ENCOUNTER (EMERGENCY)
Facility: HOSPITAL | Age: 55
Discharge: HOME | End: 2020-11-18
Attending: EMERGENCY MEDICINE
Payer: MEDICARE

## 2020-11-18 VITALS
HEIGHT: 65 IN | RESPIRATION RATE: 18 BRPM | OXYGEN SATURATION: 99 % | BODY MASS INDEX: 34.99 KG/M2 | SYSTOLIC BLOOD PRESSURE: 147 MMHG | DIASTOLIC BLOOD PRESSURE: 85 MMHG | WEIGHT: 210 LBS | HEART RATE: 85 BPM | TEMPERATURE: 97 F

## 2020-11-18 DIAGNOSIS — Z20.822 EXPOSURE TO COVID-19 VIRUS: Primary | ICD-10-CM

## 2020-11-18 PROCEDURE — 99283 EMERGENCY DEPT VISIT LOW MDM: CPT

## 2020-11-18 NOTE — ED PROVIDER NOTES
Patient Seen in: [facility] AND [facility] Emergency Department      History   Patient presents with:  Testing    Stated Complaint: fever/abd pain/sob/testing    HPI    42-year-old female with past medical history significant for diabetes, high cholesterol, pres Current:/85   Pulse 85   Temp 97.4 °F (36.3 °C) (Temporal)   Resp 18   Ht 165.1 cm (5' 5\")   Wt 95.3 kg   SpO2 99%   BMI 34.95 kg/m²         Physical Exam    Physical Exam   Constitutional: AAOx3, well nourished, NAD  Head: Normocephalic and a

## 2020-12-24 RX ORDER — ACETAMINOPHEN AND CODEINE PHOSPHATE 300; 30 MG/1; MG/1
TABLET ORAL
Qty: 10 TABLET | Refills: 0 | Status: SHIPPED | OUTPATIENT
Start: 2020-12-24 | End: 2021-01-16

## 2021-01-16 ENCOUNTER — TELEPHONE (OUTPATIENT)
Dept: ORTHOPEDICS CLINIC | Facility: CLINIC | Age: 56
End: 2021-01-16

## 2021-01-16 DIAGNOSIS — M75.22 BICEPS TENDINITIS OF LEFT SHOULDER: ICD-10-CM

## 2021-01-16 DIAGNOSIS — S46.012D TRAUMATIC TEAR OF LEFT ROTATOR CUFF, UNSPECIFIED TEAR EXTENT, SUBSEQUENT ENCOUNTER: ICD-10-CM

## 2021-01-18 RX ORDER — IBUPROFEN 600 MG/1
600 TABLET ORAL EVERY 8 HOURS
Qty: 300 TABLET | Refills: 0 | Status: SHIPPED | OUTPATIENT
Start: 2021-01-18 | End: 2021-06-21

## 2021-01-18 RX ORDER — GABAPENTIN 400 MG/1
400 CAPSULE ORAL 4 TIMES DAILY
Qty: 360 CAPSULE | Refills: 1 | Status: SHIPPED | OUTPATIENT
Start: 2021-01-18 | End: 2021-06-21

## 2021-01-18 RX ORDER — ATORVASTATIN CALCIUM 80 MG/1
TABLET, FILM COATED ORAL
Qty: 90 TABLET | Refills: 1 | Status: SHIPPED | OUTPATIENT
Start: 2021-01-18 | End: 2021-06-21

## 2021-01-18 RX ORDER — DICLOFENAC SODIUM 75 MG/1
75 TABLET, DELAYED RELEASE ORAL 2 TIMES DAILY WITH MEALS
Qty: 180 TABLET | Refills: 0 | Status: SHIPPED | OUTPATIENT
Start: 2021-01-18 | End: 2021-04-20

## 2021-01-18 RX ORDER — ACETAMINOPHEN AND CODEINE PHOSPHATE 300; 30 MG/1; MG/1
1-2 TABLET ORAL EVERY 6 HOURS PRN
Qty: 10 TABLET | Refills: 0 | Status: SHIPPED | OUTPATIENT
Start: 2021-01-18 | End: 2021-06-21

## 2021-01-18 RX ORDER — MECLIZINE HCL 12.5 MG/1
25 TABLET ORAL 3 TIMES DAILY PRN
Qty: 50 TABLET | Refills: 1 | Status: SHIPPED | OUTPATIENT
Start: 2021-01-18 | End: 2021-06-21

## 2021-01-20 RX ORDER — CYCLOBENZAPRINE HCL 10 MG
5 TABLET ORAL NIGHTLY PRN
Qty: 30 TABLET | Refills: 0 | OUTPATIENT
Start: 2021-01-20

## 2021-01-20 RX ORDER — ORPHENADRINE CITRATE 100 MG/1
100 TABLET, EXTENDED RELEASE ORAL 2 TIMES DAILY
Qty: 60 TABLET | Refills: 1 | OUTPATIENT
Start: 2021-01-20

## 2021-01-20 NOTE — TELEPHONE ENCOUNTER
Spoke to pt and she states she is requesting refill of orphenadrine 100 mg and cyclobenzaprine 10 mg. States she takes both of these but not at the same time. Both of these Rx's prescribed on 07/21/20. Pt still having left shoulder pain. Rates pain 11/10. Advised pt that she should be seen by the ortho since her pain is that bad. Pt was seen by Isidoro Mcnally and received cortisone injection on 07/21/20. Appt scheduled for 01/26/21 with Isidoro Mcnally for the left shoulder. -- HARLAN Sow, do you approve the requested medications? Please sign if you do.

## 2021-01-20 NOTE — TELEPHONE ENCOUNTER
Spoke to pt and she states she is requesting refill of Meloxicam 15 mg   Rx'd on 02/17/20 with # 30 and NRF  LOV with Virlinda Prima on 01/20/20  Pt still having left shoulder pain. Rates pain 11/10.    Advised pt that she should be seen by the ortho since her precious

## 2021-01-21 RX ORDER — MELOXICAM 15 MG/1
15 TABLET ORAL DAILY
Qty: 30 TABLET | Refills: 0 | Status: SHIPPED | OUTPATIENT
Start: 2021-01-21 | End: 2021-01-22

## 2021-01-22 RX ORDER — MELOXICAM 15 MG/1
15 TABLET ORAL DAILY
Qty: 90 TABLET | Refills: 0 | Status: SHIPPED | OUTPATIENT
Start: 2021-01-22

## 2021-02-16 ENCOUNTER — TELEPHONE (OUTPATIENT)
Dept: NEUROLOGY | Facility: CLINIC | Age: 56
End: 2021-02-16

## 2021-02-16 NOTE — TELEPHONE ENCOUNTER
Patient called regarding past procedure(TFESI) in 2019. Wanted to know if the procedure she had was a surgery.

## 2021-03-16 ENCOUNTER — TELEPHONE (OUTPATIENT)
Dept: CARDIOLOGY | Age: 56
End: 2021-03-16

## 2021-03-16 DIAGNOSIS — R00.2 PALPITATIONS: Primary | ICD-10-CM

## 2021-03-17 RX ORDER — ALBUTEROL SULFATE 90 UG/1
2 AEROSOL, METERED RESPIRATORY (INHALATION) EVERY 6 HOURS PRN
Qty: 1 INHALER | Refills: 3 | Status: SHIPPED | OUTPATIENT
Start: 2021-03-17

## 2021-03-17 NOTE — TELEPHONE ENCOUNTER
Current Outpatient Medications:   ••  Albuterol Sulfate  (90 Base) MCG/ACT Inhalation Aero Soln, Inhale 2 puffs into the lungs every 6 (six) hours as needed for

## 2021-03-18 ENCOUNTER — TELEPHONE (OUTPATIENT)
Dept: INTERNAL MEDICINE CLINIC | Facility: CLINIC | Age: 56
End: 2021-03-18

## 2021-03-18 ENCOUNTER — APPOINTMENT (OUTPATIENT)
Dept: CARDIOLOGY | Age: 56
End: 2021-03-18
Attending: INTERNAL MEDICINE

## 2021-03-19 ENCOUNTER — TELEPHONE (OUTPATIENT)
Dept: CARDIOLOGY | Age: 56
End: 2021-03-19

## 2021-03-19 ENCOUNTER — ANCILLARY PROCEDURE (OUTPATIENT)
Dept: CARDIOLOGY | Age: 56
End: 2021-03-19
Attending: INTERNAL MEDICINE

## 2021-03-19 DIAGNOSIS — R00.2 PALPITATIONS: ICD-10-CM

## 2021-03-19 NOTE — TELEPHONE ENCOUNTER
Spoke to pharmacy to process medication through brand name. Paid claim received, they will contact the patient.

## 2021-03-25 ENCOUNTER — OFFICE VISIT (OUTPATIENT)
Dept: INTERNAL MEDICINE CLINIC | Facility: CLINIC | Age: 56
End: 2021-03-25
Payer: MEDICARE

## 2021-03-25 ENCOUNTER — LAB ENCOUNTER (OUTPATIENT)
Dept: LAB | Facility: HOSPITAL | Age: 56
End: 2021-03-25
Attending: INTERNAL MEDICINE
Payer: MEDICARE

## 2021-03-25 VITALS
BODY MASS INDEX: 33.82 KG/M2 | HEART RATE: 73 BPM | SYSTOLIC BLOOD PRESSURE: 130 MMHG | DIASTOLIC BLOOD PRESSURE: 84 MMHG | WEIGHT: 203 LBS | RESPIRATION RATE: 16 BRPM | HEIGHT: 65 IN

## 2021-03-25 DIAGNOSIS — E55.9 VITAMIN D DEFICIENCY: ICD-10-CM

## 2021-03-25 DIAGNOSIS — R53.82 CHRONIC FATIGUE: ICD-10-CM

## 2021-03-25 DIAGNOSIS — E78.2 MIXED HYPERLIPIDEMIA: ICD-10-CM

## 2021-03-25 DIAGNOSIS — M25.511 CHRONIC RIGHT SHOULDER PAIN: ICD-10-CM

## 2021-03-25 DIAGNOSIS — M79.605 LUMBAR PAIN WITH RADIATION DOWN LEFT LEG: ICD-10-CM

## 2021-03-25 DIAGNOSIS — F32.0 MILD SINGLE CURRENT EPISODE OF MAJOR DEPRESSIVE DISORDER (HCC): ICD-10-CM

## 2021-03-25 DIAGNOSIS — I70.0 ATHEROSCLEROSIS OF AORTA (HCC): ICD-10-CM

## 2021-03-25 DIAGNOSIS — M54.50 LUMBAR PAIN WITH RADIATION DOWN LEFT LEG: ICD-10-CM

## 2021-03-25 DIAGNOSIS — F32.9 REACTIVE DEPRESSION: ICD-10-CM

## 2021-03-25 DIAGNOSIS — F17.200 SMOKING ADDICTION: ICD-10-CM

## 2021-03-25 DIAGNOSIS — G89.29 CHRONIC RIGHT SHOULDER PAIN: ICD-10-CM

## 2021-03-25 DIAGNOSIS — E66.01 OBESITY, MORBID (HCC): ICD-10-CM

## 2021-03-25 DIAGNOSIS — Z00.00 PHYSICAL EXAM, ANNUAL: Primary | ICD-10-CM

## 2021-03-25 LAB
ALBUMIN SERPL-MCNC: 4.1 G/DL
ALBUMIN SERPL-MCNC: 4.1 G/DL (ref 3.4–5)
ALBUMIN/GLOB SERPL: 1.1 {RATIO} (ref 1–2)
ALP LIVER SERPL-CCNC: 161 U/L
ALP SERPL-CCNC: 161 U/L
ALT SERPL-CCNC: 33 U/L
ALT SERPL-CCNC: 33 UNITS/L
ANION GAP SERPL CALC-SCNC: 6 MMOL/L
ANION GAP SERPL CALC-SCNC: 6 MMOL/L (ref 0–18)
AST SERPL-CCNC: 15 U/L (ref 15–37)
AST SERPL-CCNC: 15 UNITS/L
BASOPHILS # BLD AUTO: 0.04 X10(3) UL (ref 0–0.2)
BASOPHILS NFR BLD AUTO: 0.3 %
BILIRUB SERPL-MCNC: 0.5 MG/DL
BILIRUB SERPL-MCNC: 0.5 MG/DL (ref 0.1–2)
BILIRUB UR QL: NEGATIVE
BUN BLD-MCNC: 16 MG/DL (ref 7–18)
BUN SERPL-MCNC: 16 MG/DL
BUN/CREAT SERPL: 17.8
BUN/CREAT SERPL: 17.8 (ref 10–20)
CALCIUM BLD-MCNC: 9.7 MG/DL (ref 8.5–10.1)
CALCIUM SERPL-MCNC: 9.7 MG/DL
CHLORIDE SERPL-SCNC: 108 MMOL/L
CHLORIDE SERPL-SCNC: 108 MMOL/L (ref 98–112)
CHOLEST SERPL-MCNC: 196 MG/DL
CHOLEST SMN-MCNC: 196 MG/DL (ref ?–200)
CLARITY UR: CLEAR
CO2 SERPL-SCNC: 25 MMOL/L
CO2 SERPL-SCNC: 25 MMOL/L (ref 21–32)
COLOR UR: YELLOW
CREAT BLD-MCNC: 0.9 MG/DL
CREAT SERPL-MCNC: 0.9 MG/DL
DEPRECATED RDW RBC AUTO: 43 FL (ref 35.1–46.3)
EOSINOPHIL # BLD AUTO: 0.14 X10(3) UL (ref 0–0.7)
EOSINOPHIL NFR BLD AUTO: 1.2 %
ERYTHROCYTE [DISTWIDTH] IN BLOOD BY AUTOMATED COUNT: 14 % (ref 11–15)
GLOBULIN PLAS-MCNC: 3.9 G/DL (ref 2.8–4.4)
GLOBULIN SER-MCNC: 3.9 G/DL
GLUCOSE BLD-MCNC: 94 MG/DL (ref 70–99)
GLUCOSE SERPL-MCNC: 94 MG/DL
GLUCOSE UR-MCNC: NEGATIVE MG/DL
HCT VFR BLD AUTO: 48.8 %
HCT VFR BLD CALC: 48.8 %
HDLC SERPL-MCNC: 45 MG/DL
HDLC SERPL-MCNC: 45 MG/DL (ref 40–59)
HGB BLD-MCNC: 16.4 G/DL
HGB BLD-MCNC: 16.4 G/DL
IMM GRANULOCYTES # BLD AUTO: 0.04 X10(3) UL (ref 0–1)
IMM GRANULOCYTES NFR BLD: 0.3 %
KETONES UR-MCNC: NEGATIVE MG/DL
LDLC SERPL CALC-MCNC: 128 MG/DL
LDLC SERPL CALC-MCNC: 128 MG/DL (ref ?–100)
LYMPHOCYTES # BLD AUTO: 3.09 X10(3) UL (ref 1–4)
LYMPHOCYTES NFR BLD AUTO: 26.8 %
M PROTEIN MFR SERPL ELPH: 8 G/DL (ref 6.4–8.2)
MCH RBC QN AUTO: 28.5 PG (ref 26–34)
MCHC RBC AUTO-ENTMCNC: 33.6 G/DL (ref 31–37)
MCV RBC AUTO: 84.9 FL
MONOCYTES # BLD AUTO: 0.82 X10(3) UL (ref 0.1–1)
MONOCYTES NFR BLD AUTO: 7.1 %
NEUTROPHILS # BLD AUTO: 7.42 X10 (3) UL (ref 1.5–7.7)
NEUTROPHILS # BLD AUTO: 7.42 X10(3) UL (ref 1.5–7.7)
NEUTROPHILS NFR BLD AUTO: 64.3 %
NITRITE UR QL STRIP.AUTO: NEGATIVE
NONHDLC SERPL-MCNC: 151 MG/DL
NONHDLC SERPL-MCNC: 151 MG/DL (ref ?–130)
OSMOLALITY SERPL CALC.SUM OF ELEC: 289 MOSM/KG (ref 275–295)
PATIENT FASTING Y/N/NP: YES
PATIENT FASTING Y/N/NP: YES
PH UR: 6 [PH] (ref 5–8)
PLATELET # BLD AUTO: 252 10(3)UL (ref 150–450)
POTASSIUM SERPL-SCNC: 3.9 MMOL/L
POTASSIUM SERPL-SCNC: 3.9 MMOL/L (ref 3.5–5.1)
PROT SERPL-MCNC: 8 G/DL
PROT UR-MCNC: NEGATIVE MG/DL
RBC # BLD AUTO: 5.75 X10(6)UL
RBC # BLD: 5.75 10*6/UL
SODIUM SERPL-SCNC: 139 MMOL/L
SODIUM SERPL-SCNC: 139 MMOL/L (ref 136–145)
SP GR UR STRIP: 1.01 (ref 1–1.03)
TRIGL SERPL-MCNC: 113 MG/DL
TRIGL SERPL-MCNC: 113 MG/DL (ref 30–149)
TSH SERPL-ACNC: 1.22 MCUNITS/ML
TSI SER-ACNC: 1.22 MIU/ML (ref 0.36–3.74)
UROBILINOGEN UR STRIP-ACNC: <2
VLDLC SERPL CALC-MCNC: 23 MG/DL
VLDLC SERPL CALC-MCNC: 23 MG/DL (ref 0–30)
WBC # BLD AUTO: 11.6 X10(3) UL (ref 4–11)
WBC # BLD: 11.6 K/MCL

## 2021-03-25 PROCEDURE — 36415 COLL VENOUS BLD VENIPUNCTURE: CPT

## 2021-03-25 PROCEDURE — 80053 COMPREHEN METABOLIC PANEL: CPT

## 2021-03-25 PROCEDURE — G0439 PPPS, SUBSEQ VISIT: HCPCS | Performed by: INTERNAL MEDICINE

## 2021-03-25 PROCEDURE — 85060 BLOOD SMEAR INTERPRETATION: CPT

## 2021-03-25 PROCEDURE — 84443 ASSAY THYROID STIM HORMONE: CPT

## 2021-03-25 PROCEDURE — 82306 VITAMIN D 25 HYDROXY: CPT | Performed by: INTERNAL MEDICINE

## 2021-03-25 PROCEDURE — 80061 LIPID PANEL: CPT

## 2021-03-25 PROCEDURE — 85025 COMPLETE CBC W/AUTO DIFF WBC: CPT

## 2021-03-25 PROCEDURE — 81001 URINALYSIS AUTO W/SCOPE: CPT

## 2021-03-25 NOTE — PROGRESS NOTES
REASON FOR VISIT:    Trinity Rogers is a 60year old female who presents for a Medicare Annual Wellness visit.        Patient Care Team: Patient Care Team:  Ryland Chavarria MD as PCP - General (Internal Medicine)  Negrita Givens MD as driving on this med. Stop if lethargic or hallucinating. 60 tablet 1   • cyclobenzaprine 10 MG Oral Tab Take 0.5 tablets (5 mg total) by mouth nightly as needed for Muscle spasms. No alcohol or driving on this med. Stop if lethargic or hallucinating.  30 ta Interaction; Visiting Family  If you are a male age 38-65 or a female age 47-67, do you take aspirin?: No  Have you had any immunizations at another office such as Influenza, Hepatitis B, Tetanus, or Pneumococcal?: No     Functional Ability     Bathing or S 09/09/2019 127 (H)       EKG - w/ Initial Preventative Physical Exam only, or if medically necessary    Colorectal Cancer Screening     Colonoscopy Screen every 10 years Colonoscopy Never done    Flex Sigmoidoscopy Screen every 5 years No results found f Syndrome     Immunization History      Immunization History  Administered            Date(s) Administered    Influenza             11/20/2019      Pneumococcal (Prevnar 13)                          12/13/2019      Pneumococcal (Prevnar 7) tenderness  : deferred  RECTAL: deferred  MUSCULOSKELETAL: back is not tender, FROM of the back  EXTREMITIES: no cyanosis, clubbing or edema  NEURO: Oriented times three, cranial nerves are intact, motor and sensory are grossly intact  FOOT: normal exam anti-depressants that have been prescribed exactly as you have been instructed by our office. Follow up with regularly to help monitor your condition. Call if there is any change in your mood or symptoms that is unusual or different.  Get regular sleep to h (over the counter) twice daily as well. 10. Vitamin D deficiency    Has been diagnosed with vitamin D deficiency. Taking vitamin D over the counter daily along with 1500 mg calcium.  Advised to do weight bearing exercises as well to improve and maintain

## 2021-03-26 LAB — 25(OH)D3 SERPL-MCNC: 20.4 NG/ML (ref 30–100)

## 2021-03-30 ENCOUNTER — TELEPHONE (OUTPATIENT)
Dept: INTERNAL MEDICINE CLINIC | Facility: CLINIC | Age: 56
End: 2021-03-30

## 2021-03-30 RX ORDER — ERGOCALCIFEROL 1.25 MG/1
50000 CAPSULE ORAL
COMMUNITY
Start: 2021-03-26

## 2021-03-30 RX ORDER — ALBUTEROL SULFATE 90 UG/1
2 AEROSOL, METERED RESPIRATORY (INHALATION) PRN
COMMUNITY
Start: 2021-03-17

## 2021-03-30 RX ORDER — NICOTINE 21 MG/24HR
1 PATCH, TRANSDERMAL 24 HOURS TRANSDERMAL
COMMUNITY
Start: 2020-05-07

## 2021-03-30 RX ORDER — ATORVASTATIN CALCIUM 80 MG/1
1 TABLET, FILM COATED ORAL AT BEDTIME
COMMUNITY
Start: 2021-01-18

## 2021-03-30 RX ORDER — POLYETHYLENE GLYCOL 3350 17 G/17G
POWDER, FOR SOLUTION ORAL
COMMUNITY
Start: 2019-04-26

## 2021-03-30 RX ORDER — GABAPENTIN 400 MG/1
400 CAPSULE ORAL 4 TIMES DAILY
COMMUNITY
Start: 2021-01-18

## 2021-03-30 RX ORDER — TRIAMCINOLONE ACETONIDE 40 MG/ML
40 INJECTION, SUSPENSION INTRA-ARTICULAR; INTRAMUSCULAR
COMMUNITY
Start: 2020-04-21

## 2021-03-30 RX ORDER — DICLOFENAC SODIUM 75 MG/1
75 TABLET, DELAYED RELEASE ORAL 2 TIMES DAILY
COMMUNITY
Start: 2021-01-18

## 2021-03-30 RX ORDER — IBUPROFEN 600 MG/1
600 TABLET ORAL PRN
COMMUNITY
Start: 2021-01-18

## 2021-03-30 RX ORDER — MELOXICAM 15 MG/1
15 TABLET ORAL DAILY
COMMUNITY
Start: 2021-01-22

## 2021-03-30 RX ORDER — ACETAMINOPHEN AND CODEINE PHOSPHATE 300; 30 MG/1; MG/1
1-2 TABLET ORAL PRN
COMMUNITY
Start: 2021-01-18

## 2021-03-30 RX ORDER — CYCLOBENZAPRINE HCL 10 MG
5 TABLET ORAL
COMMUNITY
Start: 2020-07-21

## 2021-03-30 RX ORDER — MECLIZINE HCL 12.5 MG/1
25 TABLET ORAL PRN
COMMUNITY
Start: 2021-01-18

## 2021-03-30 RX ORDER — ORPHENADRINE CITRATE 100 MG/1
100 TABLET, EXTENDED RELEASE ORAL
COMMUNITY
Start: 2020-07-21

## 2021-03-30 NOTE — TELEPHONE ENCOUNTER
Routed to Dr Chang [facility] for advise, see abnormal u/a results, thanks. No future appointments.

## 2021-03-31 ENCOUNTER — TELEPHONE (OUTPATIENT)
Dept: INTERNAL MEDICINE CLINIC | Facility: CLINIC | Age: 56
End: 2021-03-31

## 2021-03-31 ENCOUNTER — TELEPHONE (OUTPATIENT)
Dept: FAMILY MEDICINE CLINIC | Facility: CLINIC | Age: 56
End: 2021-03-31

## 2021-03-31 DIAGNOSIS — R74.8 ELEVATED ALKALINE PHOSPHATASE LEVEL: Primary | ICD-10-CM

## 2021-03-31 RX ORDER — NITROFURANTOIN 25; 75 MG/1; MG/1
100 CAPSULE ORAL 2 TIMES DAILY
Qty: 6 CAPSULE | Refills: 0 | Status: SHIPPED | OUTPATIENT
Start: 2021-03-31 | End: 2021-04-03

## 2021-03-31 NOTE — TELEPHONE ENCOUNTER
Pt calling because she is still having left pelvic pain and wondering if she has a UTI based on large Leuk's and small blood on her recent U/A. Pt also wondering if she should be concerned about the elevated alk-phos level, please advise.

## 2021-03-31 NOTE — TELEPHONE ENCOUNTER
Noted message bas been addressed by Carolina Weller MD   3/31/2021  2:16 PM CDT Back to Top      Called with blood results.  Has mild UTI and antibiotic will be sent in.  Elevated red blood count may be due to smoking and the patient

## 2021-03-31 NOTE — TELEPHONE ENCOUNTER
Per patient she has an appointment on 04/28/2021 and she would like to do her blood work prior to see Dr Sheri Lopez.

## 2021-04-02 ENCOUNTER — TELEPHONE (OUTPATIENT)
Dept: CARDIOLOGY | Age: 56
End: 2021-04-02

## 2021-04-02 PROCEDURE — 93227 XTRNL ECG REC<48 HR R&I: CPT | Performed by: INTERNAL MEDICINE

## 2021-04-02 NOTE — TELEPHONE ENCOUNTER
amilcar message with MD recommendation sent to pt.          Future Appointments   Date Time Provider Tiffanie Matson   4/28/2021  2:00 PM Albino Green MD [facility]

## 2021-04-05 ENCOUNTER — TELEPHONE (OUTPATIENT)
Dept: CARDIOLOGY | Age: 56
End: 2021-04-05

## 2021-04-09 ENCOUNTER — IMMUNIZATION (OUTPATIENT)
Dept: LAB | Facility: HOSPITAL | Age: 56
End: 2021-04-09
Attending: HOSPITALIST
Payer: MEDICARE

## 2021-04-09 ENCOUNTER — OFFICE VISIT (OUTPATIENT)
Dept: CARDIOLOGY | Age: 56
End: 2021-04-09

## 2021-04-09 VITALS
OXYGEN SATURATION: 98 % | DIASTOLIC BLOOD PRESSURE: 68 MMHG | SYSTOLIC BLOOD PRESSURE: 102 MMHG | HEART RATE: 69 BPM | HEIGHT: 66 IN | WEIGHT: 202 LBS | BODY MASS INDEX: 32.47 KG/M2

## 2021-04-09 DIAGNOSIS — R00.2 PALPITATIONS: Primary | ICD-10-CM

## 2021-04-09 DIAGNOSIS — Z23 NEED FOR VACCINATION: Primary | ICD-10-CM

## 2021-04-09 PROCEDURE — 99203 OFFICE O/P NEW LOW 30 MIN: CPT | Performed by: INTERNAL MEDICINE

## 2021-04-09 PROCEDURE — 0011A SARSCOV2 VAC 100MCG/0.5ML IM: CPT

## 2021-04-09 PROCEDURE — 93000 ELECTROCARDIOGRAM COMPLETE: CPT | Performed by: INTERNAL MEDICINE

## 2021-04-09 SDOH — HEALTH STABILITY: MENTAL HEALTH: HOW OFTEN DO YOU HAVE A DRINK CONTAINING ALCOHOL?: NEVER

## 2021-04-09 ASSESSMENT — PATIENT HEALTH QUESTIONNAIRE - PHQ9
CLINICAL INTERPRETATION OF PHQ9 SCORE: NO FURTHER SCREENING NEEDED
1. LITTLE INTEREST OR PLEASURE IN DOING THINGS: NOT AT ALL
CLINICAL INTERPRETATION OF PHQ2 SCORE: NO FURTHER SCREENING NEEDED
2. FEELING DOWN, DEPRESSED OR HOPELESS: NOT AT ALL
SUM OF ALL RESPONSES TO PHQ9 QUESTIONS 1 AND 2: 0
SUM OF ALL RESPONSES TO PHQ9 QUESTIONS 1 AND 2: 0

## 2021-04-10 ENCOUNTER — TELEPHONE (OUTPATIENT)
Dept: INTERNAL MEDICINE CLINIC | Facility: CLINIC | Age: 56
End: 2021-04-10

## 2021-04-10 NOTE — TELEPHONE ENCOUNTER
Patient received [address] vaccine yesterday, 04/09. Immunization record up to date  States she began having chills, body ache, headache and fatigue hours after her hours. Max temperature was 99.3.    Patient asked if these symptoms are normal. She s

## 2021-04-12 NOTE — TELEPHONE ENCOUNTER
Relayed DR Reina Rushing comment, that she should proceed with second vaccine on schedule  Discussed, home care such as pushing fluids, for pain relief use tylenol ES not to esceed 8 tablets per day and rest. Call back if she has signs of dehydration such as

## 2021-04-12 NOTE — TELEPHONE ENCOUNTER
Patient calling with update , rodriguez Mae 1 st shot on 4/9,  ( see TE below )     Reports having episodes of \" hot /cold \"   Temp 97.9, chills, cough,  very tired , light headed, \" I feel weak, feels worse than when had Covid in [address] [facility]"      Has been alt

## 2021-04-15 ENCOUNTER — MED REC SCAN ONLY (OUTPATIENT)
Dept: INTERNAL MEDICINE CLINIC | Facility: CLINIC | Age: 56
End: 2021-04-15

## 2021-04-17 ENCOUNTER — LAB ENCOUNTER (OUTPATIENT)
Dept: LAB | Age: 56
End: 2021-04-17
Attending: INTERNAL MEDICINE
Payer: MEDICARE

## 2021-04-17 ENCOUNTER — HOSPITAL ENCOUNTER (OUTPATIENT)
Age: 56
Discharge: HOME | End: 2021-04-17
Attending: PHYSICIAN ASSISTANT
Payer: MEDICARE

## 2021-04-17 VITALS
RESPIRATION RATE: 18 BRPM | DIASTOLIC BLOOD PRESSURE: 79 MMHG | HEART RATE: 81 BPM | OXYGEN SATURATION: 98 % | SYSTOLIC BLOOD PRESSURE: 126 MMHG | TEMPERATURE: 98 F

## 2021-04-17 DIAGNOSIS — R74.8 ELEVATED ALKALINE PHOSPHATASE LEVEL: ICD-10-CM

## 2021-04-17 DIAGNOSIS — E55.9 VITAMIN D DEFICIENCY: ICD-10-CM

## 2021-04-17 DIAGNOSIS — R68.83 CHILLS: ICD-10-CM

## 2021-04-17 DIAGNOSIS — Z20.822 EXPOSURE TO COVID-19 VIRUS: ICD-10-CM

## 2021-04-17 DIAGNOSIS — Z20.822 ENCOUNTER FOR LABORATORY TESTING FOR COVID-19 VIRUS: Primary | ICD-10-CM

## 2021-04-17 PROCEDURE — 99213 OFFICE O/P EST LOW 20 MIN: CPT

## 2021-04-17 PROCEDURE — 80076 HEPATIC FUNCTION PANEL: CPT

## 2021-04-17 PROCEDURE — 99212 OFFICE O/P EST SF 10 MIN: CPT

## 2021-04-17 PROCEDURE — 82306 VITAMIN D 25 HYDROXY: CPT

## 2021-04-17 PROCEDURE — 36415 COLL VENOUS BLD VENIPUNCTURE: CPT

## 2021-04-17 PROCEDURE — 82977 ASSAY OF GGT: CPT

## 2021-04-17 NOTE — ED PROVIDER NOTES
Patient Seen in: [facility]    History   Patient presents with:  Covid-19 Test    Stated Complaint: Covid test     HPI      Mando Giraldo is a 60year old female who presents with chief complaint of chills. Onset 6 days ago.  Yasmine (eight) hours. Diclofenac Sodium 75 MG Oral Tab EC,  Take 1 tablet (75 mg total) by mouth 2 (two) times daily with meals. gabapentin 400 MG Oral Cap,  Take 1 capsule (400 mg total) by mouth 4 (four) times daily.    atorvastatin 80 MG Oral Tab,  TAKE 1 T HPI.    Physical Exam     ED Triage Vitals   BP 04/17/21 1523 126/79   Pulse 04/17/21 1453 81   Resp 04/17/21 1453 18   Temp 04/17/21 1453 97.6 °F (36.4 °C)   Temp src 04/17/21 1453 Temporal   SpO2 04/17/21 1453 98 %   O2 Device 04/17/21 1453 None (Room ai reexaminations as previously documented. Results reviewed with patient. I have given the patient instructions regarding their diagnoses, expectations, follow up, and ER precautions.  I explained to the patient that emergent conditions may arise and to g

## 2021-04-17 NOTE — ED INITIAL ASSESSMENT (HPI)
+ Covid exposure. Last contact yesterday. Patient has hx of covid + 6/2020 and 1st Moderna vaccine 1 week ago. C/o chills and slight body aches. No fever.

## 2021-04-20 RX ORDER — DICLOFENAC SODIUM 75 MG/1
TABLET, DELAYED RELEASE ORAL
Qty: 180 TABLET | Refills: 0 | Status: SHIPPED | OUTPATIENT
Start: 2021-04-20 | End: 2021-06-21

## 2021-04-27 ENCOUNTER — TELEPHONE (OUTPATIENT)
Dept: CARDIOLOGY | Age: 56
End: 2021-04-27

## 2021-04-27 ENCOUNTER — APPOINTMENT (OUTPATIENT)
Dept: CARDIOLOGY | Age: 56
End: 2021-04-27
Attending: INTERNAL MEDICINE

## 2021-04-27 ENCOUNTER — ANCILLARY PROCEDURE (OUTPATIENT)
Dept: CARDIOLOGY | Age: 56
End: 2021-04-27
Attending: INTERNAL MEDICINE

## 2021-04-27 DIAGNOSIS — R00.2 PALPITATIONS: ICD-10-CM

## 2021-04-27 PROCEDURE — 93306 TTE W/DOPPLER COMPLETE: CPT | Performed by: INTERNAL MEDICINE

## 2021-04-28 ENCOUNTER — OFFICE VISIT (OUTPATIENT)
Dept: INTERNAL MEDICINE CLINIC | Facility: CLINIC | Age: 56
End: 2021-04-28
Payer: MEDICARE

## 2021-04-28 VITALS
HEIGHT: 65 IN | RESPIRATION RATE: 16 BRPM | HEART RATE: 78 BPM | DIASTOLIC BLOOD PRESSURE: 80 MMHG | SYSTOLIC BLOOD PRESSURE: 127 MMHG | WEIGHT: 205 LBS | BODY MASS INDEX: 34.16 KG/M2

## 2021-04-28 DIAGNOSIS — F17.200 TOBACCO USE DISORDER: ICD-10-CM

## 2021-04-28 DIAGNOSIS — F32.0 MILD SINGLE CURRENT EPISODE OF MAJOR DEPRESSIVE DISORDER (HCC): ICD-10-CM

## 2021-04-28 DIAGNOSIS — M54.50 LUMBAR PAIN WITH RADIATION DOWN LEFT LEG: Primary | ICD-10-CM

## 2021-04-28 DIAGNOSIS — E66.01 OBESITY, MORBID (HCC): ICD-10-CM

## 2021-04-28 DIAGNOSIS — M79.605 LUMBAR PAIN WITH RADIATION DOWN LEFT LEG: Primary | ICD-10-CM

## 2021-04-28 PROCEDURE — 99214 OFFICE O/P EST MOD 30 MIN: CPT | Performed by: INTERNAL MEDICINE

## 2021-04-28 NOTE — PROGRESS NOTES
Dara Stinson is a 60year old female. HPI:     1. Lumbar pain with radiation down left leg    Has been having back pain that travels down both legs. Has used some pain patches and had 2 epidural injections yesterday from Dr Kushal De Jesus. Pain is cra (600 mg total) by mouth every 8 (eight) hours. 300 tablet 0   • gabapentin 400 MG Oral Cap Take 1 capsule (400 mg total) by mouth 4 (four) times daily.  360 capsule 1   • atorvastatin 80 MG Oral Tab TAKE 1 TABLET BY MOUTH AT BEDTIME 90 tablet 1   • Meclizin Vaping Use: Never used    Alcohol use: No    Drug use: Never       REVIEW OF SYSTEMS:   GENERAL HEALTH: feels well otherwise  SKIN: denies any unusual skin lesions or rashes  RESPIRATORY: denies shortness of breath with exertion  CARDIOVASCULAR: denies eduarda any change in your mood or symptoms that is unusual or different. Get regular sleep to help with your symptoms. 3. Obesity, morbid ([address]    The patient is obese with BMI near 36.   Discussed with the patient obesity complications including risk for cardi

## 2021-05-07 ENCOUNTER — IMMUNIZATION (OUTPATIENT)
Dept: LAB | Facility: HOSPITAL | Age: 56
End: 2021-05-07
Attending: EMERGENCY MEDICINE
Payer: MEDICARE

## 2021-05-07 DIAGNOSIS — Z23 NEED FOR VACCINATION: Primary | ICD-10-CM

## 2021-05-07 PROCEDURE — 0012A SARSCOV2 VAC 100MCG/0.5ML IM: CPT

## 2021-06-07 RX ORDER — ERGOCALCIFEROL 1.25 MG/1
50000 CAPSULE ORAL WEEKLY
Qty: 12 CAPSULE | Refills: 0 | Status: SHIPPED | OUTPATIENT
Start: 2021-06-07 | End: 2021-06-21

## 2021-06-15 ENCOUNTER — APPOINTMENT (OUTPATIENT)
Dept: CT IMAGING | Age: 56
End: 2021-06-15
Attending: NURSE PRACTITIONER
Payer: MEDICARE

## 2021-06-15 ENCOUNTER — HOSPITAL ENCOUNTER (OUTPATIENT)
Age: 56
Discharge: HOME | End: 2021-06-15
Payer: MEDICARE

## 2021-06-15 VITALS
HEART RATE: 74 BPM | OXYGEN SATURATION: 99 % | TEMPERATURE: 98 F | DIASTOLIC BLOOD PRESSURE: 73 MMHG | SYSTOLIC BLOOD PRESSURE: 129 MMHG | RESPIRATION RATE: 20 BRPM

## 2021-06-15 DIAGNOSIS — S09.90XA INJURY OF HEAD, INITIAL ENCOUNTER: Primary | ICD-10-CM

## 2021-06-15 PROCEDURE — 99214 OFFICE O/P EST MOD 30 MIN: CPT

## 2021-06-15 PROCEDURE — 70450 CT HEAD/BRAIN W/O DYE: CPT | Performed by: NURSE PRACTITIONER

## 2021-06-15 PROCEDURE — 99213 OFFICE O/P EST LOW 20 MIN: CPT

## 2021-06-15 RX ORDER — ACETAMINOPHEN 325 MG/1
650 TABLET ORAL ONCE
Status: COMPLETED | OUTPATIENT
Start: 2021-06-15 | End: 2021-06-15

## 2021-06-15 RX ORDER — IBUPROFEN 600 MG/1
600 TABLET ORAL ONCE
Status: COMPLETED | OUTPATIENT
Start: 2021-06-15 | End: 2021-06-15
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
## 2021-06-21 DIAGNOSIS — S46.012D TRAUMATIC TEAR OF LEFT ROTATOR CUFF, UNSPECIFIED TEAR EXTENT, SUBSEQUENT ENCOUNTER: ICD-10-CM

## 2021-06-21 DIAGNOSIS — M75.22 BICEPS TENDINITIS OF LEFT SHOULDER: ICD-10-CM

## 2021-06-21 RX ORDER — MECLIZINE HCL 12.5 MG/1
25 TABLET ORAL 3 TIMES DAILY PRN
Qty: 50 TABLET | Refills: 1 | Status: SHIPPED | OUTPATIENT
Start: 2021-06-21 | End: 2021-11-02

## 2021-06-21 RX ORDER — ERGOCALCIFEROL 1.25 MG/1
50000 CAPSULE ORAL WEEKLY
Qty: 12 CAPSULE | Refills: 0 | Status: SHIPPED | OUTPATIENT
Start: 2021-06-21 | End: 2021-10-22

## 2021-06-21 RX ORDER — IBUPROFEN 600 MG/1
600 TABLET ORAL EVERY 8 HOURS
Qty: 300 TABLET | Refills: 0 | Status: SHIPPED | OUTPATIENT
Start: 2021-06-21 | End: 2021-12-03

## 2021-06-21 RX ORDER — GABAPENTIN 400 MG/1
400 CAPSULE ORAL 4 TIMES DAILY
Qty: 360 CAPSULE | Refills: 1 | Status: SHIPPED | OUTPATIENT
Start: 2021-06-21 | End: 2021-12-23

## 2021-06-21 RX ORDER — ATORVASTATIN CALCIUM 80 MG/1
TABLET, FILM COATED ORAL
Qty: 90 TABLET | Refills: 1 | Status: SHIPPED | OUTPATIENT
Start: 2021-06-21 | End: 2021-06-28

## 2021-06-21 RX ORDER — ACETAMINOPHEN AND CODEINE PHOSPHATE 300; 30 MG/1; MG/1
1-2 TABLET ORAL EVERY 6 HOURS PRN
Qty: 10 TABLET | Refills: 0 | Status: SHIPPED | OUTPATIENT
Start: 2021-06-21

## 2021-06-21 RX ORDER — DICLOFENAC SODIUM 75 MG/1
75 TABLET, DELAYED RELEASE ORAL 2 TIMES DAILY WITH MEALS
Qty: 180 TABLET | Refills: 0 | Status: SHIPPED | OUTPATIENT
Start: 2021-06-21 | End: 2021-09-15

## 2021-06-22 ENCOUNTER — OFFICE VISIT (OUTPATIENT)
Dept: INTERNAL MEDICINE CLINIC | Facility: CLINIC | Age: 56
End: 2021-06-22
Payer: MEDICARE

## 2021-06-22 VITALS
DIASTOLIC BLOOD PRESSURE: 83 MMHG | HEART RATE: 66 BPM | HEIGHT: 65 IN | WEIGHT: 198 LBS | SYSTOLIC BLOOD PRESSURE: 138 MMHG | BODY MASS INDEX: 32.99 KG/M2 | RESPIRATION RATE: 16 BRPM

## 2021-06-22 DIAGNOSIS — E66.01 OBESITY, MORBID (HCC): ICD-10-CM

## 2021-06-22 DIAGNOSIS — M79.605 LUMBAR PAIN WITH RADIATION DOWN LEFT LEG: Primary | ICD-10-CM

## 2021-06-22 DIAGNOSIS — M54.50 LUMBAR PAIN WITH RADIATION DOWN LEFT LEG: Primary | ICD-10-CM

## 2021-06-22 DIAGNOSIS — F17.200 TOBACCO USE DISORDER: ICD-10-CM

## 2021-06-22 DIAGNOSIS — F41.1 ANXIETY STATE: ICD-10-CM

## 2021-06-22 DIAGNOSIS — F32.0 MILD SINGLE CURRENT EPISODE OF MAJOR DEPRESSIVE DISORDER (HCC): ICD-10-CM

## 2021-06-22 PROCEDURE — 99214 OFFICE O/P EST MOD 30 MIN: CPT | Performed by: INTERNAL MEDICINE

## 2021-06-22 RX ORDER — CYCLOBENZAPRINE HCL 10 MG
5 TABLET ORAL NIGHTLY PRN
Qty: 30 TABLET | Refills: 0 | OUTPATIENT
Start: 2021-06-22

## 2021-06-22 RX ORDER — ORPHENADRINE CITRATE 100 MG/1
100 TABLET, EXTENDED RELEASE ORAL 2 TIMES DAILY
Qty: 60 TABLET | Refills: 1 | OUTPATIENT
Start: 2021-06-22

## 2021-06-22 RX ORDER — CLONAZEPAM 0.5 MG/1
0.5 TABLET ORAL 2 TIMES DAILY PRN
Qty: 20 TABLET | Refills: 0 | Status: SHIPPED | OUTPATIENT
Start: 2021-06-22

## 2021-06-22 NOTE — PROGRESS NOTES
Chester Roche is a 60year old female. HPI:     1. Lumbar pain with radiation down left leg    Has been having back pain that travels down both legs. Has used some pain patches and had 2 epidural injections yesterday from Dr Estefani Carson. Pain is cra Oral Tab Take 2 tablets (25 mg total) by mouth 3 (three) times daily as needed. 50 tablet 1   • Diclofenac Sodium 75 MG Oral Tab EC Take 1 tablet (75 mg total) by mouth 2 (two) times daily with meals.  180 tablet 0   • ergocalciferol 1.25 MG (89146 UT) Oral Cigarettes      Smokeless tobacco: Never Used    Vaping Use      Vaping Use: Never used    Alcohol use: No    Drug use: Never       REVIEW OF SYSTEMS:   GENERAL HEALTH: feels well otherwise  SKIN: denies any unusual skin lesions or rashes  RESPIRATORY: den Follow up with regularly to help monitor your condition. Call if there is any change in your mood or symptoms that is unusual or different. Get regular sleep to help with your symptoms. 3. Obesity, morbid ([address]    The patient is obese with BMI near 36.

## 2021-06-22 NOTE — TELEPHONE ENCOUNTER
ASSESSMENT:   1. Family history of diabetes mellitus  Urinalysis-UC if Indicated    HM1(CBC and Differential)    Comprehensive Metabolic Panel    Glycosylated Hemoglobin A1c    HM1 (CBC with Diff)   2. Unspecified skin changes  desoximetasone (TOPICORT) 0.05 % cream       PLAN:  28-year-old female presents for evaluation of skin changes.  Initially I was suspicious for the acanthosis nigricans, patient's mother with diagnosis of diabetes in her 30s.  Patient also reported increased urinary frequency, occasional dizziness.  Labs are obtained, CBC is unremarkable without significant anemia or leukocytosis.  UA without glucose or evidence of infection.  CMP is pending.  Hemoglobin A1c is normal.  Skin changes are likely benign, with occasional itching we will go ahead and try topical steroids.  If no improvement she will follow-up with primary care in 1 week for a recheck.    I discussed red flag symptoms, return precautions to clinic/ER and follow up care with patient/parent.  Expected clinical course, symptomatic cares advised. Questions answered. Patient/parent amenable with plan.    Patient Instructions:  Patient Instructions   Labs done in clinic today are normal.  I have prescribed a topical steroid for you to try on the skin of your neck.  If no improvement, please be seen in follow up.  I will contact you once the pending results are available.      SUBJECTIVE:   Mayte Peacock is a 28 y.o. female who presents today with darkening of the skin of the back and sides of her neck and in between her breasts for the past one month.  It is not itchy, sometimes feels dry.  No crusting. Applied hydrocortisone with modest improvement.  No PMH for diabetes, mother was diagnosed with DM in her 30's.  Notes she has had increased urinary frequency for the past for one week, no dysuria or hematuria.  No itching or discharge.  Denies polydipsia or polyphagia.  Notes that she occasionally becomes lightheaded, denies room  rx sent spinning sensation.  No headaches.  No fevers or ill symptoms.  No chest pain.  No shortness breath.      ROS:  Comprehensive 12 pt ROS completed, positives noted in HPI, otherwise negative.      Past Medical History:  There is no problem list on file for this patient.      Surgical History:  No past surgical history on file.        Family History:  No family history on file.    Reviewed; Non-contributory    Social History     Tobacco Use   Smoking Status Never Smoker   Smokeless Tobacco Never Used       Current Medications:  No current outpatient medications on file prior to visit.     No current facility-administered medications on file prior to visit.        Allergies:   No Known Allergies    OBJECTIVE:   Vitals:    12/12/18 1342   BP: 124/72   Patient Site: Right Arm   Patient Position: Sitting   Cuff Size: Adult Large   Pulse: 77   Resp: 16   Temp: 97.9  F (36.6  C)   TempSrc: Oral   SpO2: 98%   Weight: (!) 256 lb (116.1 kg)     Physical exam reveals a pleasant 28 y.o. female.   Appears healthy, alert and cooperative. Non-toxic appearance.  Neck: Hyperpigmented skin overlying the posterior bilateral neck.  Mildly darkened areas on the anterior neck.  No hyperkeratosis, no crusting, no drainage.  Lungs: Chest is clear, no wheezing, rhonchi or rales. Symmetric air entry throughout both lung fields.  Heart: regular rate and rhythm, no murmur, rub or gallop  Abdomen: soft, nontender. No masses or organomegaly.  No CVAT.  Skin: pink, warm, dry.  There is hyperpigmented skin to the medial aspects of the bilateral breasts.  No crusting, no drainage.       RADIOLOGY    none  LABORATORY STUDIES    Recent Results (from the past 24 hour(s))   Urinalysis-UC if Indicated   Result Value Ref Range    Color, UA Yellow Colorless, Yellow, Straw, Light Yellow    Clarity, UA Clear Clear    Glucose, UA Negative Negative    Bilirubin, UA Negative Negative    Ketones, UA Negative Negative    Specific Gravity, UA >=1.030 1.005 - 1.030     Blood, UA Negative Negative    pH, UA 6.0 5.0 - 8.0    Protein, UA Negative Negative mg/dL    Urobilinogen, UA 0.2 E.U./dL 0.2 E.U./dL, 1.0 E.U./dL    Nitrite, UA Negative Negative    Leukocytes, UA Negative Negative    Bacteria, UA Few (!) None Seen hpf    RBC, UA None Seen None Seen, 0-2 hpf    WBC, UA 0-5 None Seen, 0-5 hpf    Squam Epithel, UA 10-25 (!) None Seen, 0-5 lpf   Glycosylated Hemoglobin A1c   Result Value Ref Range    Hemoglobin A1c 5.6 3.5 - 6.0 %   HM1 (CBC with Diff)   Result Value Ref Range    WBC 5.3 4.0 - 11.0 thou/uL    RBC 4.74 3.80 - 5.40 mill/uL    Hemoglobin 12.0 12.0 - 16.0 g/dL    Hematocrit 36.8 35.0 - 47.0 %    MCV 78 (L) 80 - 100 fL    MCH 25.3 (L) 27.0 - 34.0 pg    MCHC 32.5 32.0 - 36.0 g/dL    RDW 13.0 11.0 - 14.5 %    Platelets 280 140 - 440 thou/uL    MPV 8.3 7.0 - 10.0 fL    Neutrophils % 45 (L) 50 - 70 %    Lymphocytes % 46 (H) 20 - 40 %    Monocytes % 7 2 - 10 %    Eosinophils % 2 0 - 6 %    Basophils % 0 0 - 2 %    Neutrophils Absolute 2.4 2.0 - 7.7 thou/uL    Lymphocytes Absolute 2.5 0.8 - 4.4 thou/uL    Monocytes Absolute 0.4 0.0 - 0.9 thou/uL    Eosinophils Absolute 0.1 0.0 - 0.4 thou/uL    Basophils Absolute 0.0 0.0 - 0.2 thou/uL           Tila Bass, CNP

## 2021-06-28 RX ORDER — ATORVASTATIN CALCIUM 80 MG/1
TABLET, FILM COATED ORAL
Qty: 90 TABLET | Refills: 1 | Status: SHIPPED | OUTPATIENT
Start: 2021-06-28 | End: 2021-10-22

## 2021-07-13 ENCOUNTER — OFFICE VISIT (OUTPATIENT)
Dept: INTERNAL MEDICINE CLINIC | Facility: CLINIC | Age: 56
End: 2021-07-13
Payer: MEDICARE

## 2021-07-13 ENCOUNTER — HOSPITAL ENCOUNTER (OUTPATIENT)
Dept: GENERAL RADIOLOGY | Age: 56
Discharge: HOME | End: 2021-07-13
Attending: INTERNAL MEDICINE
Payer: MEDICARE

## 2021-07-13 VITALS
BODY MASS INDEX: 32.33 KG/M2 | DIASTOLIC BLOOD PRESSURE: 86 MMHG | TEMPERATURE: 98 F | OXYGEN SATURATION: 99 % | WEIGHT: 196.38 LBS | HEART RATE: 73 BPM | HEIGHT: 65.5 IN | SYSTOLIC BLOOD PRESSURE: 133 MMHG

## 2021-07-13 DIAGNOSIS — J43.9 PULMONARY EMPHYSEMA, UNSPECIFIED EMPHYSEMA TYPE (HCC): ICD-10-CM

## 2021-07-13 DIAGNOSIS — J45.21 MILD INTERMITTENT ASTHMA WITH ACUTE EXACERBATION: ICD-10-CM

## 2021-07-13 DIAGNOSIS — J45.21 MILD INTERMITTENT ASTHMA WITH ACUTE EXACERBATION: Primary | ICD-10-CM

## 2021-07-13 PROCEDURE — 71046 X-RAY EXAM CHEST 2 VIEWS: CPT | Performed by: INTERNAL MEDICINE

## 2021-07-13 PROCEDURE — 99214 OFFICE O/P EST MOD 30 MIN: CPT | Performed by: INTERNAL MEDICINE

## 2021-07-13 RX ORDER — TIOTROPIUM BROMIDE 18 UG/1
18 CAPSULE ORAL; RESPIRATORY (INHALATION) DAILY
Qty: 90 CAPSULE | Refills: 3 | Status: SHIPPED | OUTPATIENT
Start: 2021-07-13 | End: 2022-07-08

## 2021-07-13 RX ORDER — METHYLPREDNISOLONE 4 MG/1
TABLET ORAL
Qty: 1 EACH | Refills: 0 | Status: SHIPPED | OUTPATIENT
Start: 2021-07-13

## 2021-07-13 NOTE — PATIENT INSTRUCTIONS
Spiriva HandiHaler 18 mcg/inh  Uses  This medicine is used for the following purposes:  · asthma  · chronic lung disease  Instructions  The capsule needs to be placed into your inhaler before each use.  Please follow the instructions for your inhaler care alert or to react quickly may be impaired by this medicine. Do not drive or operate machinery until you know how this medicine will affect you. Tell the doctor or pharmacist if you are pregnant, planning to be pregnant, or breastfeeding.   Do not use this [address]

## 2021-07-13 NOTE — PROGRESS NOTES
History of Present Illness   Patient ID: Francisco Javier Bello is a 60year old female. Chief Complaint: Establish Care (sob, back pain)      Asthma  This is a chronic problem. The current episode started in the past 7 days.  The problem occurs every back: Normal, normal range of motion and neck supple. Thoracic back: Normal.      Lumbar back: Normal.   Skin:     Coloration: Skin is not jaundiced. Neurological:      General: No focal deficit present.       Mental Status: She is alert and oriented deficiency      Chronic pain of left knee      Mild single current episode of major depressive disorder (HCC)      Atherosclerosis of aorta (HCC)      Elevated liver enzymes      Lumbar pain with radiation down left leg      Mixed hyperlipidemia      Tobac puffs into the lungs every 6 (six) hours as needed for Wheezing or Shortness of Breath. 1 Inhaler 3   • Meloxicam 15 MG Oral Tab Take 1 tablet (15 mg total) by mouth daily.  90 tablet 0   • Orphenadrine Citrate  MG Oral Tablet 12 Hr Take 100 mg by izabel using this medicine. Stopping suddenly could cause your breathing to become worse. It is important that you keep taking each dose of this medicine on time even if you are feeling well.   If you forget to take a dose on time, take it as soon as you remember effects:  · eye pain or redness  · difficulty or discomfort urinating  · blurring or changes of vision  · seeing halos or colors around lights  · worsening breathing symptoms  A few people may have an allergic reactions to this medicine.  Symptoms can inclu

## 2021-09-13 ENCOUNTER — TELEMEDICINE (OUTPATIENT)
Dept: INTERNAL MEDICINE CLINIC | Facility: CLINIC | Age: 56
End: 2021-09-13
Payer: MEDICARE

## 2021-09-13 DIAGNOSIS — J98.01 BRONCHOSPASM: ICD-10-CM

## 2021-09-13 DIAGNOSIS — Z13.29 SCREENING FOR ENDOCRINE, METABOLIC AND IMMUNITY DISORDER: ICD-10-CM

## 2021-09-13 DIAGNOSIS — Z13.0 SCREENING FOR ENDOCRINE, METABOLIC AND IMMUNITY DISORDER: ICD-10-CM

## 2021-09-13 DIAGNOSIS — Z13.228 SCREENING FOR ENDOCRINE, METABOLIC AND IMMUNITY DISORDER: ICD-10-CM

## 2021-09-13 DIAGNOSIS — Z20.822 SUSPECTED COVID-19 VIRUS INFECTION: Primary | ICD-10-CM

## 2021-09-13 DIAGNOSIS — J44.1 COPD WITH ACUTE EXACERBATION (HCC): ICD-10-CM

## 2021-09-13 PROCEDURE — 99214 OFFICE O/P EST MOD 30 MIN: CPT | Performed by: INTERNAL MEDICINE

## 2021-09-13 RX ORDER — DOXYCYCLINE HYCLATE 100 MG
100 TABLET ORAL 2 TIMES DAILY
Qty: 10 TABLET | Refills: 0 | Status: SHIPPED | OUTPATIENT
Start: 2021-09-13 | End: 2021-09-18

## 2021-09-13 RX ORDER — CODEINE PHOSPHATE AND GUAIFENESIN 10; 100 MG/5ML; MG/5ML
5 SOLUTION ORAL EVERY 6 HOURS PRN
Qty: 240 ML | Refills: 0 | Status: SHIPPED | OUTPATIENT
Start: 2021-09-13

## 2021-09-13 RX ORDER — IPRATROPIUM BROMIDE AND ALBUTEROL SULFATE 2.5; .5 MG/3ML; MG/3ML
3 SOLUTION RESPIRATORY (INHALATION) EVERY 4 HOURS PRN
Qty: 30 EACH | Refills: 1 | Status: SHIPPED | OUTPATIENT
Start: 2021-09-13 | End: 2021-10-29

## 2021-09-13 NOTE — PROGRESS NOTES
Telehealth Visit      I spoke with Lilia Claudio by secure [employer] video service on 09/13/21, verified date of birth, patient stated they are in the state of [place], and discussed their concerns as below:     HPI   Pleasant 23-year-old acute distress. Appearance: Normal appearance. HENT:      Head: Normocephalic and atraumatic.       Right Ear: External ear normal.      Left Ear: External ear normal.      Nose: Nose normal.   Eyes:      Conjunctiva/sclera: Conjunctivae normal.   Pul 400 MG Oral Cap Take 1 capsule (400 mg total) by mouth 4 (four) times daily. 360 capsule 1   • Acetaminophen-Codeine #3 300-30 MG Oral Tab Take 1-2 tablets by mouth every 6 (six) hours as needed for Pain.  10 tablet 0   • Meclizine HCl 12.5 MG Oral Tab Take ASTHMA  Dispense: 3 each; Refill: 3  -     Doxycycline Hyclate; Take 1 tablet (100 mg total) by mouth 2 (two) times daily for 5 days. Dispense: 10 tablet; Refill: 0  -     Ipratropium-Albuterol;  Take 3 mL by nebulization every 4 (four) hours as needed (co Smoking status: Current Every Day Smoker        Packs/day: 1.00        Years: 20.00        Pack years: 20        Types: Cigarettes      Smokeless tobacco: Never Used    Vaping Use      Vaping Use: Never used    Alcohol use: No    Drug use: Never       Leodis Bowels have been spent reviewing labs, medications, radiology tests and decision making. Appropriate medical decision-making and tests are ordered as detailed in the plan of care above.   Coding/billing information is submitted for this visit based on complexity o

## 2021-09-15 ENCOUNTER — LAB ENCOUNTER (OUTPATIENT)
Dept: LAB | Facility: HOSPITAL | Age: 56
End: 2021-09-15
Attending: INTERNAL MEDICINE
Payer: MEDICARE

## 2021-09-15 DIAGNOSIS — Z20.822 SUSPECTED COVID-19 VIRUS INFECTION: ICD-10-CM

## 2021-09-15 RX ORDER — DICLOFENAC SODIUM 75 MG/1
75 TABLET, DELAYED RELEASE ORAL 2 TIMES DAILY WITH MEALS
Qty: 180 TABLET | Refills: 1 | Status: SHIPPED | OUTPATIENT
Start: 2021-09-15

## 2021-09-15 NOTE — TELEPHONE ENCOUNTER
Refill passed per [facility] protocol. Requested Prescriptions   Pending Prescriptions Disp Refills    diclofenac 75 MG Oral Tab  tablet 0     Sig: Take 1 tablet (75 mg total) by mouth 2 (two) times daily with meals.         Non-Narcotic Pain

## 2021-09-15 NOTE — TELEPHONE ENCOUNTER
Current Outpatient Medications:     •  Diclofenac Sodium 75 MG Oral Tab EC, Take 1 tablet (75 mg total) by mouth 2 (two) times daily with meals. , Disp: 180 tablet, Rfl: 0

## 2021-09-17 LAB — SARS-COV-2 RNA RESP QL NAA+PROBE: NOT DETECTED

## 2021-09-21 ENCOUNTER — TELEPHONE (OUTPATIENT)
Dept: INTERNAL MEDICINE CLINIC | Facility: CLINIC | Age: 56
End: 2021-09-21

## 2021-09-21 NOTE — TELEPHONE ENCOUNTER
Patient called to confirm her COVID-19 test is negative. Patient states she viewed the results in My Chart but wanted to make sure.    Informed patient that her COVID-19 test done on 09/15/21 was negative

## 2021-10-19 ENCOUNTER — LAB ENCOUNTER (OUTPATIENT)
Dept: LAB | Age: 56
End: 2021-10-19
Attending: INTERNAL MEDICINE
Payer: MEDICARE

## 2021-10-19 ENCOUNTER — HOSPITAL ENCOUNTER (OUTPATIENT)
Age: 56
Discharge: HOME | End: 2021-10-19
Payer: MEDICARE

## 2021-10-19 DIAGNOSIS — Z13.29 SCREENING FOR ENDOCRINE, METABOLIC AND IMMUNITY DISORDER: ICD-10-CM

## 2021-10-19 DIAGNOSIS — Z13.228 SCREENING FOR ENDOCRINE, METABOLIC AND IMMUNITY DISORDER: ICD-10-CM

## 2021-10-19 DIAGNOSIS — Z13.0 SCREENING FOR ENDOCRINE, METABOLIC AND IMMUNITY DISORDER: ICD-10-CM

## 2021-10-19 PROCEDURE — 80061 LIPID PANEL: CPT

## 2021-10-19 PROCEDURE — 85027 COMPLETE CBC AUTOMATED: CPT

## 2021-10-19 PROCEDURE — 90471 IMMUNIZATION ADMIN: CPT

## 2021-10-19 PROCEDURE — 80053 COMPREHEN METABOLIC PANEL: CPT

## 2021-10-19 PROCEDURE — 83036 HEMOGLOBIN GLYCOSYLATED A1C: CPT

## 2021-10-19 PROCEDURE — 36415 COLL VENOUS BLD VENIPUNCTURE: CPT

## 2021-10-22 ENCOUNTER — TELEMEDICINE (OUTPATIENT)
Dept: INTERNAL MEDICINE CLINIC | Facility: CLINIC | Age: 56
End: 2021-10-22
Payer: MEDICARE

## 2021-10-22 DIAGNOSIS — E55.9 VITAMIN D DEFICIENCY: ICD-10-CM

## 2021-10-22 DIAGNOSIS — E78.2 MIXED HYPERLIPIDEMIA: Primary | ICD-10-CM

## 2021-10-22 DIAGNOSIS — Z13.6 SCREENING, ISCHEMIC HEART DISEASE: ICD-10-CM

## 2021-10-22 PROCEDURE — 99214 OFFICE O/P EST MOD 30 MIN: CPT | Performed by: INTERNAL MEDICINE

## 2021-10-22 RX ORDER — ROSUVASTATIN CALCIUM 10 MG/1
10 TABLET, COATED ORAL NIGHTLY
Qty: 90 TABLET | Refills: 3 | Status: SHIPPED | OUTPATIENT
Start: 2021-10-22

## 2021-10-22 RX ORDER — ERGOCALCIFEROL 1.25 MG/1
50000 CAPSULE ORAL WEEKLY
Qty: 12 CAPSULE | Refills: 0 | Status: SHIPPED | OUTPATIENT
Start: 2021-10-22

## 2021-10-22 NOTE — PROGRESS NOTES
Telehealth Visit      I spoke with Mikey Claudio by secure [employer] video service on 10/22/21, verified date of birth, patient stated they are in the state of [place], and discussed their concerns as below:     Hyperlipidemia  This is a Conjunctivae normal.   Pulmonary:      Effort: Pulmonary effort is normal. No respiratory distress. Musculoskeletal:      Cervical back: Normal range of motion and neck supple. Skin:     Coloration: Skin is not jaundiced.    Neurological:      General: times daily. 360 capsule 1   • Acetaminophen-Codeine #3 300-30 MG Oral Tab Take 1-2 tablets by mouth every 6 (six) hours as needed for Pain.  10 tablet 0   • Meclizine HCl 12.5 MG Oral Tab Take 2 tablets (25 mg total) by mouth 3 (three) times daily as neede atorvastatin. Repeat labs in about 6 weeks to check response. Continue with vitamin D. Follow Up: As needed/if symptoms worsen or Return for 6 weeks DEPENDING ON LAB/IMAGE RESULTS. .         Labs/imaging, medical/social history  Pertinent labs and imag This has been done in good pete to provide continuity of care in the best interest of the provider-patient relationship, due to the COVID -19 public health crisis/national emergency where restrictions of face-to-face office visits are ongoing.  Every [facility]

## 2021-10-29 DIAGNOSIS — J44.1 COPD WITH ACUTE EXACERBATION (HCC): ICD-10-CM

## 2021-10-29 RX ORDER — IPRATROPIUM BROMIDE AND ALBUTEROL SULFATE 2.5; .5 MG/3ML; MG/3ML
3 SOLUTION RESPIRATORY (INHALATION) EVERY 4 HOURS PRN
Qty: 90 ML | Refills: 1 | Status: SHIPPED | OUTPATIENT
Start: 2021-10-29

## 2021-10-29 NOTE — TELEPHONE ENCOUNTER
Refill passed per [address] protocol.     Requested Prescriptions   Pending Prescriptions Disp Refills    IPRATROPIUM-ALBUTEROL 0.5-2.5 (3) MG/3ML Inhalation Solution [Pharmacy Med Name: IPRATROPI/ALB 0.5/3MG INH SL 30X3ML] 90 mL 0     Sig: USE 3 ML VIA NEBULIZER EVERY 4 HOURS AS NEEDED FOR COUGH OR SHORTNESS OF BREATH OR WHEEZING        Asthma & COPD Medication Protocol Passed - 10/29/2021  8:09 AM        Passed - Appointment in past 6 or next 3 months              Recent Outpatient Visits              1 week ago Mixed hyperlipidemia    Skip Wallace MD    Telemedicine    1 month ago Suspected COVID-19 virus infection    [address] Alisa Chappell Leita Sorrow, MD    Telemedicine    3 months ago Mild intermittent asthma with acute exacerbation    Angie Coles MD    Office Visit    4 months ago Lumbar pain with radiation down left leg    Tiffanie Kurtz Rollie Pon, MD    Office Visit    6 months ago Lumbar pain with radiation down left leg    Juan David Kurtz Rollie Pon, MD    Office Visit

## 2021-10-30 ENCOUNTER — TELEPHONE (OUTPATIENT)
Dept: INTERNAL MEDICINE CLINIC | Facility: CLINIC | Age: 56
End: 2021-10-30

## 2021-10-30 NOTE — TELEPHONE ENCOUNTER
ipratropium-albuterol 0.5-2.5 (3) MG/3ML Inhalation Solution, Take 3 mL by nebulization every 4 (four) hours as needed. , Disp: 90 mL, Rfl: 1    Mendoza: Jerry [place]   Patient Last Name: Jessica Hooper  : 1965

## 2021-11-02 NOTE — TELEPHONE ENCOUNTER
Please review. Protocol failed / No protocol. Medication last prescribed by Dr. Kurtis Villagomez.    Requested Prescriptions   Pending Prescriptions Disp Refills    meclizine 12.5 MG Oral Tab 50 tablet 1     Sig: Take 2 tablets (25 mg total) by mouth 3 (three) ti

## 2021-11-02 NOTE — TELEPHONE ENCOUNTER
•  Meclizine HCl 12.5 MG Oral Tab, Take 2 tablets (25 mg total) by mouth 3 (three) times daily as needed. , Disp: 50 tablet, Rfl: 1

## 2021-11-03 RX ORDER — MECLIZINE HCL 12.5 MG/1
25 TABLET ORAL 3 TIMES DAILY PRN
Qty: 90 TABLET | Refills: 1 | Status: SHIPPED | OUTPATIENT
Start: 2021-11-03

## 2021-11-03 NOTE — TELEPHONE ENCOUNTER
Spoke to [employer] it went through her medicare part B and no prior authorization is needed.

## 2021-11-09 ENCOUNTER — IMMUNIZATION (OUTPATIENT)
Dept: LAB | Facility: HOSPITAL | Age: 56
End: 2021-11-09
Attending: EMERGENCY MEDICINE
Payer: MEDICARE

## 2021-11-09 DIAGNOSIS — Z23 NEED FOR VACCINATION: Primary | ICD-10-CM

## 2021-11-09 PROCEDURE — 0064A SARSCOV2 VAC 50MCG/0.25ML IM: CPT

## 2021-11-10 ENCOUNTER — TELEPHONE (OUTPATIENT)
Dept: FAMILY MEDICINE CLINIC | Facility: CLINIC | Age: 56
End: 2021-11-10

## 2021-11-10 NOTE — TELEPHONE ENCOUNTER
Spoke to patient and relayed Dr. Isai Platt message below. Patient verbalized understanding and had no further questions.

## 2021-11-10 NOTE — TELEPHONE ENCOUNTER
Noted, agree with triage advice, this is a natural course of Covid vaccination. This should resolve in the next 72 hours. Continue with conservative therapy, NSAIDs, ice on the arm. .  There is no treatment or evaluation that is necessary at this time.

## 2021-11-10 NOTE — TELEPHONE ENCOUNTER
Patient calling, identified name and , got Covid booster shot yesterday , has chills and has been constantly crying since 2am , right arm at injection site is swollen and slightly red.     Feeling very emotional, overwhelmed and cannot explain theses fee

## 2021-12-01 NOTE — TELEPHONE ENCOUNTER
ibuprofen 600 MG Oral Tab, Take 1 tablet (600 mg total) by mouth every 8 (eight) hours. , Disp: 300 tablet, Rfl: 0

## 2021-12-03 RX ORDER — IBUPROFEN 600 MG/1
600 TABLET ORAL EVERY 8 HOURS PRN
Qty: 300 TABLET | Refills: 0 | Status: SHIPPED | OUTPATIENT
Start: 2021-12-03 | End: 2022-10-10

## 2021-12-23 RX ORDER — GABAPENTIN 400 MG/1
400 CAPSULE ORAL 4 TIMES DAILY
Qty: 360 CAPSULE | Refills: 0 | Status: SHIPPED | OUTPATIENT
Start: 2021-12-23

## 2021-12-23 NOTE — TELEPHONE ENCOUNTER
gabapentin 400 MG Oral Cap, Take 1 capsule (400 mg total) by mouth 4 (four) times daily. , Disp: 360 capsule, Rfl: 1

## 2022-01-08 ENCOUNTER — TELEPHONE (OUTPATIENT)
Dept: INTERNAL MEDICINE CLINIC | Facility: CLINIC | Age: 57
End: 2022-01-08

## 2022-01-08 DIAGNOSIS — J44.1 COPD WITH ACUTE EXACERBATION (HCC): ICD-10-CM

## 2022-01-08 DIAGNOSIS — J98.01 BRONCHOSPASM: ICD-10-CM

## 2022-01-08 RX ORDER — FLUTICASONE PROPIONATE AND SALMETEROL XINAFOATE 230; 21 UG/1; UG/1
2 AEROSOL, METERED RESPIRATORY (INHALATION) 2 TIMES DAILY
Qty: 3 EACH | Refills: 3 | Status: CANCELLED | OUTPATIENT
Start: 2022-01-08

## 2022-01-10 NOTE — TELEPHONE ENCOUNTER
Spoke with pt name and  verified  Pt advised on message below, states she has enough for now and will call when needed to see if in stock otherwise will try other pharmacy

## 2022-03-01 RX ORDER — ERGOCALCIFEROL 1.25 MG/1
CAPSULE ORAL
Qty: 12 CAPSULE | Refills: 0 | Status: SHIPPED | OUTPATIENT
Start: 2022-03-01

## 2022-04-04 ENCOUNTER — OFFICE VISIT (OUTPATIENT)
Dept: INTERNAL MEDICINE CLINIC | Facility: CLINIC | Age: 57
End: 2022-04-04
Payer: MEDICARE

## 2022-04-04 ENCOUNTER — LAB ENCOUNTER (OUTPATIENT)
Dept: LAB | Age: 57
End: 2022-04-04
Attending: INTERNAL MEDICINE
Payer: MEDICARE

## 2022-04-04 VITALS
HEART RATE: 72 BPM | SYSTOLIC BLOOD PRESSURE: 134 MMHG | WEIGHT: 196 LBS | BODY MASS INDEX: 32.26 KG/M2 | DIASTOLIC BLOOD PRESSURE: 84 MMHG | HEIGHT: 65.5 IN

## 2022-04-04 DIAGNOSIS — Z01.419 ENCOUNTER FOR ANNUAL ROUTINE GYNECOLOGICAL EXAMINATION: ICD-10-CM

## 2022-04-04 DIAGNOSIS — S46.012D TRAUMATIC TEAR OF LEFT ROTATOR CUFF, UNSPECIFIED TEAR EXTENT, SUBSEQUENT ENCOUNTER: ICD-10-CM

## 2022-04-04 DIAGNOSIS — Z23 NEED FOR SHINGLES VACCINE: ICD-10-CM

## 2022-04-04 DIAGNOSIS — Z00.00 ENCOUNTER FOR MEDICARE ANNUAL WELLNESS EXAM: ICD-10-CM

## 2022-04-04 DIAGNOSIS — E55.9 VITAMIN D DEFICIENCY: ICD-10-CM

## 2022-04-04 DIAGNOSIS — Z12.31 ENCOUNTER FOR SCREENING MAMMOGRAM FOR BREAST CANCER: ICD-10-CM

## 2022-04-04 DIAGNOSIS — G89.4 CHRONIC PAIN SYNDROME: ICD-10-CM

## 2022-04-04 DIAGNOSIS — F41.1 GAD (GENERALIZED ANXIETY DISORDER): ICD-10-CM

## 2022-04-04 DIAGNOSIS — Z12.11 COLON CANCER SCREENING: ICD-10-CM

## 2022-04-04 DIAGNOSIS — J43.9 PULMONARY EMPHYSEMA, UNSPECIFIED EMPHYSEMA TYPE (HCC): ICD-10-CM

## 2022-04-04 DIAGNOSIS — Z00.00 ENCOUNTER FOR MEDICARE ANNUAL WELLNESS EXAM: Primary | ICD-10-CM

## 2022-04-04 DIAGNOSIS — F17.200 TOBACCO USE DISORDER: ICD-10-CM

## 2022-04-04 DIAGNOSIS — G47.00 INSOMNIA, UNSPECIFIED TYPE: ICD-10-CM

## 2022-04-04 DIAGNOSIS — E78.2 MIXED HYPERLIPIDEMIA: ICD-10-CM

## 2022-04-04 DIAGNOSIS — M54.16 LUMBAR RADICULOPATHY: ICD-10-CM

## 2022-04-04 DIAGNOSIS — I70.0 ATHEROSCLEROSIS OF AORTA (HCC): ICD-10-CM

## 2022-04-04 PROBLEM — E66.3 PATIENT OVERWEIGHT: Status: RESOLVED | Noted: 2019-09-09 | Resolved: 2022-04-04

## 2022-04-04 PROBLEM — F32.0 MILD SINGLE CURRENT EPISODE OF MAJOR DEPRESSIVE DISORDER: Status: RESOLVED | Noted: 2018-02-16 | Resolved: 2022-04-04

## 2022-04-04 PROBLEM — M79.605 LUMBAR PAIN WITH RADIATION DOWN LEFT LEG: Status: RESOLVED | Noted: 2017-01-05 | Resolved: 2022-04-04

## 2022-04-04 PROBLEM — G89.29 CHRONIC RIGHT SHOULDER PAIN: Status: RESOLVED | Noted: 2021-03-25 | Resolved: 2022-04-04

## 2022-04-04 PROBLEM — U07.1 COVID-19: Status: RESOLVED | Noted: 2020-06-19 | Resolved: 2022-04-04

## 2022-04-04 PROBLEM — M25.562 CHRONIC PAIN OF LEFT KNEE: Status: RESOLVED | Noted: 2018-04-02 | Resolved: 2022-04-04

## 2022-04-04 PROBLEM — G89.29 CHRONIC PAIN OF LEFT KNEE: Status: RESOLVED | Noted: 2018-04-02 | Resolved: 2022-04-04

## 2022-04-04 PROBLEM — M54.50 LUMBAR PAIN WITH RADIATION DOWN LEFT LEG: Status: RESOLVED | Noted: 2017-01-05 | Resolved: 2022-04-04

## 2022-04-04 PROBLEM — J45.21 MILD INTERMITTENT ASTHMA WITH ACUTE EXACERBATION: Status: RESOLVED | Noted: 2021-07-13 | Resolved: 2022-04-04

## 2022-04-04 PROBLEM — F32.0 MILD SINGLE CURRENT EPISODE OF MAJOR DEPRESSIVE DISORDER (HCC): Status: RESOLVED | Noted: 2018-02-16 | Resolved: 2022-04-04

## 2022-04-04 PROBLEM — R74.8 ELEVATED LIVER ENZYMES: Status: RESOLVED | Noted: 2017-01-16 | Resolved: 2022-04-04

## 2022-04-04 PROBLEM — M25.511 CHRONIC RIGHT SHOULDER PAIN: Status: RESOLVED | Noted: 2021-03-25 | Resolved: 2022-04-04

## 2022-04-04 PROBLEM — E66.01 OBESITY, MORBID (HCC): Status: RESOLVED | Noted: 2019-09-10 | Resolved: 2022-04-04

## 2022-04-04 PROBLEM — M25.512 CHRONIC LEFT SHOULDER PAIN: Status: RESOLVED | Noted: 2020-06-19 | Resolved: 2022-04-04

## 2022-04-04 PROBLEM — S43.422A SPRAIN OF LEFT ROTATOR CUFF CAPSULE: Status: RESOLVED | Noted: 2020-03-14 | Resolved: 2022-04-04

## 2022-04-04 PROBLEM — G89.29 CHRONIC LEFT SHOULDER PAIN: Status: RESOLVED | Noted: 2020-06-19 | Resolved: 2022-04-04

## 2022-04-04 PROBLEM — R53.82 CHRONIC FATIGUE: Status: RESOLVED | Noted: 2021-03-25 | Resolved: 2022-04-04

## 2022-04-04 PROBLEM — J45.21 MILD INTERMITTENT ASTHMA WITH ACUTE EXACERBATION (HCC): Status: RESOLVED | Noted: 2021-07-13 | Resolved: 2022-04-04

## 2022-04-04 LAB
ALBUMIN SERPL-MCNC: 4 G/DL (ref 3.4–5)
ALBUMIN/GLOB SERPL: 1.1 {RATIO} (ref 1–2)
ALP LIVER SERPL-CCNC: 163 U/L
ALT SERPL-CCNC: 27 U/L
ANION GAP SERPL CALC-SCNC: 8 MMOL/L (ref 0–18)
AST SERPL-CCNC: 16 U/L (ref 15–37)
BILIRUB SERPL-MCNC: 0.5 MG/DL (ref 0.1–2)
BUN BLD-MCNC: 8 MG/DL (ref 7–18)
BUN/CREAT SERPL: 8 (ref 10–20)
CALCIUM BLD-MCNC: 9.9 MG/DL (ref 8.5–10.1)
CHLORIDE SERPL-SCNC: 108 MMOL/L (ref 98–112)
CHOLEST SERPL-MCNC: 221 MG/DL (ref ?–200)
CO2 SERPL-SCNC: 25 MMOL/L (ref 21–32)
CREAT BLD-MCNC: 1 MG/DL
DEPRECATED RDW RBC AUTO: 45.4 FL (ref 35.1–46.3)
ERYTHROCYTE [DISTWIDTH] IN BLOOD BY AUTOMATED COUNT: 14.1 % (ref 11–15)
EST. AVERAGE GLUCOSE BLD GHB EST-MCNC: 120 MG/DL (ref 68–126)
FASTING PATIENT LIPID ANSWER: YES
FASTING STATUS PATIENT QL REPORTED: YES
GLOBULIN PLAS-MCNC: 3.5 G/DL (ref 2.8–4.4)
GLUCOSE BLD-MCNC: 98 MG/DL (ref 70–99)
HBA1C MFR BLD: 5.8 % (ref ?–5.7)
HCT VFR BLD AUTO: 49.1 %
HDLC SERPL-MCNC: 42 MG/DL (ref 40–59)
HGB BLD-MCNC: 16.4 G/DL
LDLC SERPL CALC-MCNC: 147 MG/DL (ref ?–100)
MCH RBC QN AUTO: 29 PG (ref 26–34)
MCHC RBC AUTO-ENTMCNC: 33.4 G/DL (ref 31–37)
MCV RBC AUTO: 86.7 FL
NONHDLC SERPL-MCNC: 179 MG/DL (ref ?–130)
OSMOLALITY SERPL CALC.SUM OF ELEC: 290 MOSM/KG (ref 275–295)
PLATELET # BLD AUTO: 243 10(3)UL (ref 150–450)
POTASSIUM SERPL-SCNC: 4.3 MMOL/L (ref 3.5–5.1)
PROT SERPL-MCNC: 7.5 G/DL (ref 6.4–8.2)
RBC # BLD AUTO: 5.66 X10(6)UL
SODIUM SERPL-SCNC: 141 MMOL/L (ref 136–145)
TRIGL SERPL-MCNC: 177 MG/DL (ref 30–149)
TSI SER-ACNC: 1.35 MIU/ML (ref 0.36–3.74)
VIT D+METAB SERPL-MCNC: 38.5 NG/ML (ref 30–100)
VLDLC SERPL CALC-MCNC: 34 MG/DL (ref 0–30)
WBC # BLD AUTO: 9.6 X10(3) UL (ref 4–11)

## 2022-04-04 PROCEDURE — 85027 COMPLETE CBC AUTOMATED: CPT

## 2022-04-04 PROCEDURE — 83036 HEMOGLOBIN GLYCOSYLATED A1C: CPT

## 2022-04-04 PROCEDURE — 80053 COMPREHEN METABOLIC PANEL: CPT

## 2022-04-04 PROCEDURE — G0439 PPPS, SUBSEQ VISIT: HCPCS | Performed by: INTERNAL MEDICINE

## 2022-04-04 PROCEDURE — 82306 VITAMIN D 25 HYDROXY: CPT

## 2022-04-04 PROCEDURE — 84443 ASSAY THYROID STIM HORMONE: CPT

## 2022-04-04 PROCEDURE — 36415 COLL VENOUS BLD VENIPUNCTURE: CPT

## 2022-04-04 PROCEDURE — 80061 LIPID PANEL: CPT

## 2022-04-04 RX ORDER — ROSUVASTATIN CALCIUM 10 MG/1
10 TABLET, COATED ORAL NIGHTLY
Qty: 90 TABLET | Refills: 3 | Status: SHIPPED | OUTPATIENT
Start: 2022-04-04

## 2022-04-04 RX ORDER — MELOXICAM 15 MG/1
15 TABLET ORAL DAILY PRN
Qty: 90 TABLET | Refills: 3 | Status: SHIPPED | OUTPATIENT
Start: 2022-04-04

## 2022-04-04 RX ORDER — CYCLOBENZAPRINE HCL 10 MG
5 TABLET ORAL 3 TIMES DAILY PRN
Qty: 90 TABLET | Refills: 2 | Status: SHIPPED | OUTPATIENT
Start: 2022-04-04

## 2022-04-04 RX ORDER — TIOTROPIUM BROMIDE 18 UG/1
18 CAPSULE ORAL; RESPIRATORY (INHALATION) DAILY
Qty: 90 CAPSULE | Refills: 3 | Status: SHIPPED | OUTPATIENT
Start: 2022-04-04 | End: 2023-03-30

## 2022-04-04 RX ORDER — ERGOCALCIFEROL 1.25 MG/1
CAPSULE ORAL
Qty: 12 CAPSULE | Refills: 0 | Status: SHIPPED | OUTPATIENT
Start: 2022-04-04

## 2022-04-04 RX ORDER — ESCITALOPRAM OXALATE 5 MG/1
5 TABLET ORAL DAILY
Qty: 90 TABLET | Refills: 3 | Status: SHIPPED | OUTPATIENT
Start: 2022-04-04

## 2022-04-04 RX ORDER — GABAPENTIN 400 MG/1
400 CAPSULE ORAL 4 TIMES DAILY
Qty: 360 CAPSULE | Refills: 3 | Status: SHIPPED | OUTPATIENT
Start: 2022-04-04

## 2022-04-05 ENCOUNTER — TELEPHONE (OUTPATIENT)
Dept: INTERNAL MEDICINE CLINIC | Facility: CLINIC | Age: 57
End: 2022-04-05

## 2022-04-05 NOTE — TELEPHONE ENCOUNTER
escitalopram 5 MG Oral Tab, Take 1 tablet (5 mg total) by mouth daily. FOR ANXIETY., Disp:   90 tablet, Rfl: 3    Pharmacy note: Plan does not cover medication.  Please call to initiate PA. [phone]

## 2022-04-14 ENCOUNTER — TELEPHONE (OUTPATIENT)
Dept: INTERNAL MEDICINE CLINIC | Facility: CLINIC | Age: 57
End: 2022-04-14

## 2022-04-29 ENCOUNTER — IMMUNIZATION (OUTPATIENT)
Dept: LAB | Age: 57
End: 2022-04-29
Attending: EMERGENCY MEDICINE
Payer: MEDICARE

## 2022-04-29 ENCOUNTER — OFFICE VISIT (OUTPATIENT)
Dept: FAMILY MEDICINE CLINIC | Facility: CLINIC | Age: 57
End: 2022-04-29
Payer: MEDICARE

## 2022-04-29 DIAGNOSIS — Z23 NEED FOR VACCINATION: Primary | ICD-10-CM

## 2022-04-29 PROCEDURE — 90471 IMMUNIZATION ADMIN: CPT | Performed by: PHYSICIAN ASSISTANT

## 2022-04-29 PROCEDURE — 90750 HZV VACC RECOMBINANT IM: CPT | Performed by: PHYSICIAN ASSISTANT

## 2022-04-29 PROCEDURE — 0064A SARSCOV2 VAC 50MCG/0.25ML IM: CPT

## 2022-04-29 NOTE — PROGRESS NOTES
Patient requesting Shingrix and covid vaccination. No adverse reactions in the past to previous vaccination. Vaccination administration form filled out by patient and reviewed. Benefits and adverse reactions of vaccinations discussed. Patient advised to wait 20 minutes post vaccination to monitor for any immediate side effects. Shingrix vaccination given in left deltoid. Covid vaccination given on right deltoid. Patient tolerated well without any issues. VIS provided. Vaccination information sheet also provided with the after visit summary . No questions or concerns at this time. Patient understands and agrees with plan.       Pierce Mcadams PA-C  4/29/2022  12:38 PM

## 2022-07-03 DIAGNOSIS — E55.9 VITAMIN D DEFICIENCY: ICD-10-CM

## 2022-07-05 RX ORDER — ERGOCALCIFEROL 1.25 MG/1
CAPSULE ORAL
Qty: 12 CAPSULE | Refills: 0 | Status: SHIPPED | OUTPATIENT
Start: 2022-07-05

## 2022-08-08 ENCOUNTER — TELEPHONE (OUTPATIENT)
Dept: INTERNAL MEDICINE CLINIC | Facility: CLINIC | Age: 57
End: 2022-08-08

## 2022-08-08 ENCOUNTER — MED REC SCAN ONLY (OUTPATIENT)
Dept: INTERNAL MEDICINE CLINIC | Facility: CLINIC | Age: 57
End: 2022-08-08

## 2022-08-08 NOTE — TELEPHONE ENCOUNTER
Patient is calling to advise PCP the Benefit Form PCP completed was not signed. Patient will be stopping by this morning.   Patient notes Dr. Argelia Neumann signed in the past.

## 2022-09-11 DIAGNOSIS — J43.9 PULMONARY EMPHYSEMA, UNSPECIFIED EMPHYSEMA TYPE (HCC): ICD-10-CM

## 2022-09-12 RX ORDER — TIOTROPIUM BROMIDE 18 UG/1
CAPSULE ORAL; RESPIRATORY (INHALATION)
Qty: 90 CAPSULE | Refills: 3 | OUTPATIENT
Start: 2022-09-12

## 2022-09-29 ENCOUNTER — HOSPITAL ENCOUNTER (OUTPATIENT)
Dept: GENERAL RADIOLOGY | Age: 57
Discharge: HOME | End: 2022-09-29
Attending: INTERNAL MEDICINE
Payer: MEDICARE

## 2022-09-29 ENCOUNTER — OFFICE VISIT (OUTPATIENT)
Dept: INTERNAL MEDICINE CLINIC | Facility: CLINIC | Age: 57
End: 2022-09-29

## 2022-09-29 VITALS — DIASTOLIC BLOOD PRESSURE: 82 MMHG | HEART RATE: 76 BPM | SYSTOLIC BLOOD PRESSURE: 132 MMHG

## 2022-09-29 DIAGNOSIS — M54.42 ACUTE LEFT-SIDED LOW BACK PAIN WITH LEFT-SIDED SCIATICA: ICD-10-CM

## 2022-09-29 DIAGNOSIS — M54.42 ACUTE LEFT-SIDED LOW BACK PAIN WITH LEFT-SIDED SCIATICA: Primary | ICD-10-CM

## 2022-09-29 DIAGNOSIS — Z23 FLU VACCINE NEED: ICD-10-CM

## 2022-09-29 PROCEDURE — 99214 OFFICE O/P EST MOD 30 MIN: CPT | Performed by: INTERNAL MEDICINE

## 2022-09-29 PROCEDURE — 72110 X-RAY EXAM L-2 SPINE 4/>VWS: CPT | Performed by: INTERNAL MEDICINE

## 2022-09-29 PROCEDURE — G0008 ADMIN INFLUENZA VIRUS VAC: HCPCS | Performed by: INTERNAL MEDICINE

## 2022-09-29 PROCEDURE — 90686 IIV4 VACC NO PRSV 0.5 ML IM: CPT | Performed by: INTERNAL MEDICINE

## 2022-09-29 RX ORDER — CYCLOBENZAPRINE HCL 5 MG
TABLET ORAL 3 TIMES DAILY PRN
Qty: 90 TABLET | Refills: 1 | Status: SHIPPED | OUTPATIENT
Start: 2022-09-29

## 2022-09-29 RX ORDER — PREDNISONE 10 MG/1
TABLET ORAL
Qty: 18 TABLET | Refills: 0 | Status: SHIPPED | OUTPATIENT
Start: 2022-09-29 | End: 2022-10-08

## 2022-09-30 ENCOUNTER — TELEPHONE (OUTPATIENT)
Dept: INTERNAL MEDICINE CLINIC | Facility: CLINIC | Age: 57
End: 2022-09-30

## 2022-10-03 DIAGNOSIS — E55.9 VITAMIN D DEFICIENCY: ICD-10-CM

## 2022-10-03 NOTE — TELEPHONE ENCOUNTER
Prior authorization for cyclobenzaprine was done through [employer].  It can take 1-5 business days for a decision to come back

## 2022-10-03 NOTE — TELEPHONE ENCOUNTER
Please review refill failed/no protocol     Requested Prescriptions     Pending Prescriptions Disp Refills    ERGOCALCIFEROL 1.25 MG (33896 UT) Oral Cap [Pharmacy Med Name: VITAMIN D2 50,000IU (ERGO) CAP RX] 12 capsule 0     Sig: TAKE 1 CAPSULE BY MOUTH 1 TIME A WEEK         Recent Visits  Date Type Provider Dept   09/29/22 [facility] Visit [facility]   04/04/22 Office Visit Brett Morris MD [facility]   07/13/21 Office Visit [facility]   Showing recent visits within past 540 days with a meds authorizing provider and meeting all other requirements  Future Appointments  No visits were found meeting these conditions.   Showing future appointments within next 150 days with a meds authorizing provider and meeting all other requirements    Requested Prescriptions   Pending Prescriptions Disp Refills    ERGOCALCIFEROL 1.25 MG (68569 UT) Oral Cap [Pharmacy Med Name: VITAMIN D2 50,000IU (ERGO) CAP RX] 12 capsule 0     Sig: TAKE 1 CAPSULE BY MOUTH 1 TIME A WEEK        There is no refill protocol information for this order           Recent Outpatient Visits              4 days ago Acute left-sided low back pain with left-sided sciatica    Renaldo Bean MD    Office Visit    5 months ago DON (generalized anxiety disorder)    [facility], [facility], [address]Renal[address] MD    Telemedicine    5 months ago Need for vaccination    [address] [facility] Ronald Monterroso PA-C    Office Visit    6 months ago Encounter for [employer] annual wellness exam    Renaldo Bean MD    Office Visit    11 months ago Mixed hyperlipidemia    Renaldo Bean MD    Telemedicine

## 2022-10-06 NOTE — TELEPHONE ENCOUNTER
Refill passed per [facility] protocol. Please see pended medication for your review.   Although Refill passed per [facility] protocol, it is an NSAID, requiring doctor review prior to approval per current Refill Protocol    Requested Prescriptions   Pending Prescriptions Disp Refills    IBUPROFEN 600 MG Oral Tab [Pharmacy Med Name: IBUPROFEN 600MG TABLETS] 300 tablet 0     Sig: TAKE 1 TABLET(600 MG) BY MOUTH EVERY 8 HOURS AS NEEDED FOR PAIN        Non-Narcotic Pain Medication Protocol Passed - 10/5/2022  4:07 PM        Passed - In person appointment or virtual visit in the past 6 mos or appointment in next 3 mos       Recent Outpatient Visits              1 week ago Acute left-sided low back pain with left-sided sciatica    [address] Jim[address] MD    Office Visit    5 months ago DON (generalized anxiety disorder)    [facility], [address] Kwan, Jack Hernandez MD    Telemedicine    5 months ago Need for vaccination    [address] [place], [place]    Office Visit    6 months ago Encounter for [employer] annual wellness exam    [address] Jimenez MD    Office Visit    11 months ago Mixed hyperlipidemia    [facility], [address]uca Kwan, Jack Hernandez MD    Telemedicine                    IBUPROFEN 600 MG Oral Tab [Pharmacy Med Name: IBUPROFEN 600MG TABLETS] 300 tablet 0     Sig: TAKE 1 TABLET(600 MG) BY MOUTH EVERY 8 HOURS AS NEEDED FOR PAIN        Non-Narcotic Pain Medication Protocol Passed - 10/5/2022  4:07 PM        Passed - In person appointment or virtual visit in the past 6 mos or appointment in next 3 mos       Recent Outpatient Visits              1 week ago Acute left-sided low back pain with left-sided sciatica    [address] Jim[address] MD    Office Visit    5 months ago DON (generalized anxiety disorder)    [facility], Cuca Kwan, Kaila Santiago MD Telemedicine    5 months ago Need for vaccination    [address] [place]    Office Visit    6 months ago Encounter for [employer] annual wellness exam    Margarita King MD    Office Visit    11 months ago Mixed hyperlipidemia    Margarita King MD    Telemedicine                     Recent Outpatient Visits              1 week ago Acute left-sided low back pain with left-sided sciatica    Margarita Kign MD    Office Visit    5 months ago DON (generalized anxiety disorder)    Margarita King MD    Telemedicine    5 months ago Need for vaccination    [address] Akbar PA-C    Office Visit    6 months ago Encounter for [employer] annual wellness exam    Zan Bronson MD    Office Visit    11 months ago Mixed hyperlipidemia    Margarita King MD    Telemedicine

## 2022-10-10 RX ORDER — ERGOCALCIFEROL 1.25 MG/1
50000 CAPSULE ORAL WEEKLY
Qty: 12 CAPSULE | Refills: 1 | Status: SHIPPED | OUTPATIENT
Start: 2022-10-10

## 2022-10-10 RX ORDER — IBUPROFEN 600 MG/1
TABLET ORAL
Qty: 300 TABLET | Refills: 0 | Status: SHIPPED | OUTPATIENT
Start: 2022-10-10

## 2023-01-14 DIAGNOSIS — E78.2 MIXED HYPERLIPIDEMIA: ICD-10-CM

## 2023-01-14 DIAGNOSIS — I70.0 ATHEROSCLEROSIS OF AORTA (HCC): ICD-10-CM

## 2023-01-16 RX ORDER — ROSUVASTATIN CALCIUM 20 MG/1
TABLET, COATED ORAL
Qty: 90 TABLET | Refills: 3 | Status: SHIPPED | OUTPATIENT
Start: 2023-01-16

## 2023-01-31 ENCOUNTER — TELEPHONE (OUTPATIENT)
Dept: INTERNAL MEDICINE CLINIC | Facility: CLINIC | Age: 58
End: 2023-01-31

## 2023-03-14 ENCOUNTER — TELEPHONE (OUTPATIENT)
Dept: INTERNAL MEDICINE CLINIC | Facility: CLINIC | Age: 58
End: 2023-03-14

## 2023-03-16 ENCOUNTER — HOSPITAL ENCOUNTER (OUTPATIENT)
Age: 58
Discharge: HOME | End: 2023-03-16
Attending: EMERGENCY MEDICINE
Payer: MEDICARE

## 2023-03-16 VITALS
RESPIRATION RATE: 20 BRPM | HEART RATE: 72 BPM | TEMPERATURE: 97 F | OXYGEN SATURATION: 99 % | DIASTOLIC BLOOD PRESSURE: 62 MMHG | SYSTOLIC BLOOD PRESSURE: 142 MMHG

## 2023-03-16 DIAGNOSIS — J06.9 VIRAL URI: Primary | ICD-10-CM

## 2023-03-16 LAB — SARS-COV-2 RNA RESP QL NAA+PROBE: NOT DETECTED

## 2023-03-16 PROCEDURE — 99213 OFFICE O/P EST LOW 20 MIN: CPT

## 2023-03-16 PROCEDURE — 99212 OFFICE O/P EST SF 10 MIN: CPT

## 2023-03-31 ENCOUNTER — LAB ENCOUNTER (OUTPATIENT)
Dept: LAB | Facility: HOSPITAL | Age: 58
End: 2023-03-31
Attending: INTERNAL MEDICINE
Payer: MEDICARE

## 2023-03-31 DIAGNOSIS — Z00.00 ENCOUNTER FOR MEDICARE ANNUAL WELLNESS EXAM: ICD-10-CM

## 2023-03-31 DIAGNOSIS — E78.2 MIXED HYPERLIPIDEMIA: ICD-10-CM

## 2023-03-31 LAB
ALT SERPL-CCNC: 37 U/L
AST SERPL-CCNC: 21 U/L (ref 15–37)
CHOLEST SERPL-MCNC: 194 MG/DL (ref ?–200)
EST. AVERAGE GLUCOSE BLD GHB EST-MCNC: 120 MG/DL (ref 68–126)
FASTING PATIENT LIPID ANSWER: YES
HBA1C MFR BLD: 5.8 % (ref ?–5.7)
HDLC SERPL-MCNC: 43 MG/DL (ref 40–59)
LDLC SERPL CALC-MCNC: 125 MG/DL (ref ?–100)
NONHDLC SERPL-MCNC: 151 MG/DL (ref ?–130)
TRIGL SERPL-MCNC: 143 MG/DL (ref 30–149)
VLDLC SERPL CALC-MCNC: 26 MG/DL (ref 0–30)

## 2023-03-31 PROCEDURE — 84460 ALANINE AMINO (ALT) (SGPT): CPT

## 2023-03-31 PROCEDURE — 36415 COLL VENOUS BLD VENIPUNCTURE: CPT

## 2023-03-31 PROCEDURE — 80061 LIPID PANEL: CPT

## 2023-03-31 PROCEDURE — 84450 TRANSFERASE (AST) (SGOT): CPT

## 2023-03-31 PROCEDURE — 83036 HEMOGLOBIN GLYCOSYLATED A1C: CPT

## 2023-04-11 DIAGNOSIS — E55.9 VITAMIN D DEFICIENCY: ICD-10-CM

## 2023-04-12 NOTE — TELEPHONE ENCOUNTER
Please review; protocol failed. Or has no protocol. Routing to Dr. Antony Nicholson for Dr Alma Arnett.     Requested Prescriptions   Pending Prescriptions Disp Refills    ERGOCALCIFEROL 1.25 MG (92528 UT) Oral Cap [Pharmacy Med Name: VITAMIN D2 50,000IU (ERGO) CAP RX] 12 capsule 1     Sig: TAKE 1 CAPSULE BY MOUTH 1 TIME A WEEK       There is no refill protocol information for this order           Recent Outpatient Visits              6 months ago Acute left-sided low back pain with left-sided sciatica    Jude Rivera MD    Office Visit    11 months ago DON (generalized anxiety disorder)    [facility], [address], Jude Jose MD    Telemedicine    11 months ago Need for vaccination    [facility] [address], Suzy [address], [place], [facility]    Office Visit    1 year ago Encounter for [employer] annual wellness exam    Jude Rivera MD    Office Visit    1 year ago Mixed hyperlipidemia    Jude Rivera MD    Telemedicine

## 2023-04-15 RX ORDER — ERGOCALCIFEROL 1.25 MG/1
50000 CAPSULE ORAL WEEKLY
Qty: 12 CAPSULE | Refills: 1 | OUTPATIENT
Start: 2023-04-15

## 2023-04-15 NOTE — TELEPHONE ENCOUNTER
Vit D level was done a year ago  Refill not appropriated   Need to repeat Vit D level   Follow up with PCP

## 2023-04-19 NOTE — TELEPHONE ENCOUNTER
Called patient advised her that Medicare appointment is needed    Stated labs where discuss at her  appointment by Dr. Mckay Toledo appointment is needed     Attempted to transfer and patient disconnected the call

## 2023-04-19 NOTE — TELEPHONE ENCOUNTER
Patient wants Dr Alice Fajardo's nurse to call her regarding her lab results.   Call her at: phone# [phone]    Patient states she will call back to schedule a n office visit for refills of her Vit D.

## 2023-05-22 ENCOUNTER — LAB ENCOUNTER (OUTPATIENT)
Dept: LAB | Age: 58
End: 2023-05-22
Attending: INTERNAL MEDICINE
Payer: MEDICARE

## 2023-05-22 ENCOUNTER — OFFICE VISIT (OUTPATIENT)
Dept: INTERNAL MEDICINE CLINIC | Facility: CLINIC | Age: 58
End: 2023-05-22

## 2023-05-22 VITALS
SYSTOLIC BLOOD PRESSURE: 102 MMHG | BODY MASS INDEX: 34.07 KG/M2 | DIASTOLIC BLOOD PRESSURE: 68 MMHG | OXYGEN SATURATION: 98 % | WEIGHT: 207 LBS | HEIGHT: 65.5 IN | HEART RATE: 89 BPM

## 2023-05-22 DIAGNOSIS — E55.9 VITAMIN D DEFICIENCY: ICD-10-CM

## 2023-05-22 DIAGNOSIS — Z11.59 NEED FOR HEPATITIS C SCREENING TEST: ICD-10-CM

## 2023-05-22 DIAGNOSIS — Z12.11 ENCOUNTER FOR SCREENING COLONOSCOPY: ICD-10-CM

## 2023-05-22 DIAGNOSIS — F41.1 GAD (GENERALIZED ANXIETY DISORDER): ICD-10-CM

## 2023-05-22 DIAGNOSIS — K08.109 TOOTH LOSS: Primary | ICD-10-CM

## 2023-05-22 DIAGNOSIS — Z12.4 CERVICAL CANCER SCREENING: ICD-10-CM

## 2023-05-22 DIAGNOSIS — F17.200 TOBACCO USE DISORDER: ICD-10-CM

## 2023-05-22 DIAGNOSIS — Z86.39 HISTORY OF IRON DEFICIENCY: ICD-10-CM

## 2023-05-22 DIAGNOSIS — I70.0 ATHEROSCLEROSIS OF AORTA (HCC): ICD-10-CM

## 2023-05-22 DIAGNOSIS — Z00.00 ENCOUNTER FOR ANNUAL HEALTH EXAMINATION: ICD-10-CM

## 2023-05-22 DIAGNOSIS — Z87.891 PERSONAL HISTORY OF TOBACCO USE, PRESENTING HAZARDS TO HEALTH: ICD-10-CM

## 2023-05-22 DIAGNOSIS — Z11.4 SCREENING FOR HIV (HUMAN IMMUNODEFICIENCY VIRUS): ICD-10-CM

## 2023-05-22 DIAGNOSIS — G89.4 CHRONIC PAIN SYNDROME: ICD-10-CM

## 2023-05-22 DIAGNOSIS — R53.83 FATIGUE, UNSPECIFIED TYPE: ICD-10-CM

## 2023-05-22 DIAGNOSIS — Z23 NEED FOR SHINGLES VACCINE: ICD-10-CM

## 2023-05-22 DIAGNOSIS — E78.2 MIXED HYPERLIPIDEMIA: ICD-10-CM

## 2023-05-22 DIAGNOSIS — M54.16 LUMBAR RADICULOPATHY: ICD-10-CM

## 2023-05-22 DIAGNOSIS — G47.00 INSOMNIA, UNSPECIFIED TYPE: ICD-10-CM

## 2023-05-22 DIAGNOSIS — J43.9 PULMONARY EMPHYSEMA, UNSPECIFIED EMPHYSEMA TYPE (HCC): ICD-10-CM

## 2023-05-22 DIAGNOSIS — Z78.0 POSTMENOPAUSAL: ICD-10-CM

## 2023-05-22 DIAGNOSIS — Z12.31 VISIT FOR SCREENING MAMMOGRAM: ICD-10-CM

## 2023-05-22 DIAGNOSIS — Z87.891 HX OF TOBACCO USE, PRESENTING HAZARDS TO HEALTH: ICD-10-CM

## 2023-05-22 DIAGNOSIS — Z12.2 SCREENING FOR LUNG CANCER: ICD-10-CM

## 2023-05-22 LAB
IRON SATN MFR SERPL: 15 %
IRON SERPL-MCNC: 64 UG/DL
TIBC SERPL-MCNC: 423 UG/DL (ref 240–450)
TRANSFERRIN SERPL-MCNC: 284 MG/DL (ref 200–360)
TSI SER-ACNC: 1.06 MIU/ML (ref 0.36–3.74)
VIT D+METAB SERPL-MCNC: 49.3 NG/ML (ref 30–100)

## 2023-05-22 PROCEDURE — 87389 HIV-1 AG W/HIV-1&-2 AB AG IA: CPT

## 2023-05-22 PROCEDURE — 83540 ASSAY OF IRON: CPT

## 2023-05-22 PROCEDURE — 82306 VITAMIN D 25 HYDROXY: CPT

## 2023-05-22 PROCEDURE — 36415 COLL VENOUS BLD VENIPUNCTURE: CPT

## 2023-05-22 PROCEDURE — 84443 ASSAY THYROID STIM HORMONE: CPT

## 2023-05-22 PROCEDURE — 84466 ASSAY OF TRANSFERRIN: CPT

## 2023-05-22 PROCEDURE — 86803 HEPATITIS C AB TEST: CPT

## 2023-05-22 RX ORDER — TRAZODONE HYDROCHLORIDE 50 MG/1
50 TABLET ORAL NIGHTLY
Qty: 90 TABLET | Refills: 3 | Status: SHIPPED | OUTPATIENT
Start: 2023-05-22

## 2023-05-22 RX ORDER — ERGOCALCIFEROL 1.25 MG/1
50000 CAPSULE ORAL WEEKLY
Qty: 4 CAPSULE | Refills: 0 | Status: SHIPPED | OUTPATIENT
Start: 2023-05-22 | End: 2023-06-21

## 2023-05-23 LAB — HCV AB SERPL QL IA: NONREACTIVE

## 2023-06-09 ENCOUNTER — HOSPITAL ENCOUNTER (OUTPATIENT)
Dept: MAMMOGRAPHY | Facility: HOSPITAL | Age: 58
Discharge: HOME | End: 2023-06-09
Attending: INTERNAL MEDICINE
Payer: MEDICARE

## 2023-06-09 ENCOUNTER — TELEPHONE (OUTPATIENT)
Dept: INTERNAL MEDICINE CLINIC | Facility: CLINIC | Age: 58
End: 2023-06-09

## 2023-06-09 DIAGNOSIS — Z12.31 VISIT FOR SCREENING MAMMOGRAM: ICD-10-CM

## 2023-06-09 PROCEDURE — 77067 SCR MAMMO BI INCL CAD: CPT | Performed by: INTERNAL MEDICINE

## 2023-06-09 PROCEDURE — 77063 BREAST TOMOSYNTHESIS BI: CPT | Performed by: INTERNAL MEDICINE

## 2023-06-09 NOTE — TELEPHONE ENCOUNTER
[facility] staff please assist.     Patient states that her insurance faxed forms that need to be completed for clarification on 5/25/23 as well as 6/1/23. She needed this faxed back by today. Her insurance states that they have not received anything and left a voicemail but there are no telephone encounters from 5/25/23 on regarding this issue. Patient saw Dr. Chris Robertson on 5/22/23. Patient repeated correct fax number for [facility].

## 2023-06-13 NOTE — TELEPHONE ENCOUNTER
Spoke to patient in regards to form completion. Form has not been received. Advised patient to call insurance and have them refax forms. Patient would like a call back when forms are completed. Awaiting fax.

## 2023-06-14 ENCOUNTER — MED REC SCAN ONLY (OUTPATIENT)
Dept: INTERNAL MEDICINE CLINIC | Facility: CLINIC | Age: 58
End: 2023-06-14

## 2023-06-14 ENCOUNTER — HOSPITAL ENCOUNTER (OUTPATIENT)
Dept: CT IMAGING | Age: 58
Discharge: HOME | End: 2023-06-14
Attending: INTERNAL MEDICINE
Payer: MEDICARE

## 2023-06-14 DIAGNOSIS — Z12.2 SCREENING FOR LUNG CANCER: ICD-10-CM

## 2023-06-14 DIAGNOSIS — Z87.891 HX OF TOBACCO USE, PRESENTING HAZARDS TO HEALTH: ICD-10-CM

## 2023-06-14 PROCEDURE — 71271 CT THORAX LUNG CANCER SCR C-: CPT | Performed by: INTERNAL MEDICINE

## 2023-06-19 ENCOUNTER — HOSPITAL ENCOUNTER (OUTPATIENT)
Dept: BONE DENSITY | Facility: HOSPITAL | Age: 58
Discharge: HOME | End: 2023-06-19
Attending: INTERNAL MEDICINE
Payer: MEDICARE

## 2023-06-19 DIAGNOSIS — K08.109 TOOTH LOSS: ICD-10-CM

## 2023-06-19 DIAGNOSIS — Z78.0 POSTMENOPAUSAL: ICD-10-CM

## 2023-06-19 PROCEDURE — 77080 DXA BONE DENSITY AXIAL: CPT | Performed by: INTERNAL MEDICINE

## 2023-06-26 ENCOUNTER — NURSE ONLY (OUTPATIENT)
Facility: CLINIC | Age: 58
End: 2023-06-26

## 2023-06-26 NOTE — PROGRESS NOTES
Patient did not complete the required GI questionnaires prior to the scheduled telephone colon screening appointment. Per department protocol we cannot proceed with this appointment. Please note the patient was not contacted & the TE is now closed. CCS: If patient contacts GI please notify him/her of cancellation reasoning. If appropriate, please reschedule today's telephone colon screening appointment. TCS guidelines:   Patients must meet the following criteria to be considered for a screening telephone call prior to scheduling a routine colonoscopy:      Age 54-65 years, no GI symptoms, bleeding or anemia, no active or significant cardiac or pulmonary history, no poorly controlled hypertension, diabetes or other chronic issues. Thank you!

## 2023-07-03 ENCOUNTER — MED REC SCAN ONLY (OUTPATIENT)
Dept: INTERNAL MEDICINE CLINIC | Facility: CLINIC | Age: 58
End: 2023-07-03

## 2023-07-20 ENCOUNTER — TELEPHONE (OUTPATIENT)
Dept: INTERNAL MEDICINE CLINIC | Facility: CLINIC | Age: 58
End: 2023-07-20

## 2023-07-20 NOTE — TELEPHONE ENCOUNTER
Name is pronounced MERARY-KD not mario. Patient called (identified name and ),   States has been working with [facility] about forms. Stated the insurance had refaxed, asking if Dr Betsey Farah got them? Please discuss with her/        Lars Teague, [facility]    23 10:49 AM  Note  Spoke to patient regarding forms completion. She stated form was fill out incorrectly. Patient had injury on 23, injury was not reoccurring. Patient advised to call insurance and have forms re-faxed. Patient Education     Viral Upper Respiratory Illness (Child)  Your child has a viral upper respiratory illness (URI). This is also called a common cold. The virus is contagious during the first few days. It is spread through the air by coughing or sneezing, or by direct contact. This means by touching your sick child then touching your own eyes, nose, or mouth. Washing your hands often will decrease risk of spreading the virus. Most viral illnesses go away within 7 to 14 days with rest and simple home remedies. But they may sometimes last up to 4 weeks. Antibiotics will not kill a virus. They are generally not prescribed for this condition.     Home care  · Fluids. Fever increases the amount of water lost from the body. Encourage your child to drink lots of fluids to loosen lung secretions and make it easier to breathe.   ? For babies under 1 year old,  continue regular formula feedings or breastfeeding. Between feedings, give oral rehydration solution. This is available from drugstores and grocery stores without a prescription.  ? For children over 1 year old, give plenty of fluids, such as water, juice, gelatin water, soda without caffeine, ginger ale, lemonade, or ice pops.  · Eating. If your child doesn't want to eat solid foods, it's OK for a few days, as long as he or she drinks lots of fluid.  · Rest. Keep children with fever at home resting or playing quietly until the fever is gone. Encourage frequent naps. Your child may return to  or school when the fever is gone and he or she is eating well, does not tire easily, and is feeling better.  · Sleep. Periods of sleeplessness and irritability are common.  ? Children 1 year and older:  Have your child sleep in a slightly upright position. This is to help make breathing easier. If possible, raise the head of the bed slightly. Or raise your older child’s head and upper body up with extra pillows. Talk with your healthcare provider about how far to raise  your child's head.  ? Babies younger than 12 months: Never use pillows or put your baby to sleep on their stomach or side. Babies younger than 12 months should sleep on a flat surface on their back. Don't use car seats, strollers, swings, baby carriers, and baby slings for sleep. If your baby falls asleep in one of these, move them to a flat, firm surface as soon as you can.     · Cough. Coughing is a normal part of this illness. A cool mist humidifier at the bedside may help. Clean the humidifier every day to prevent mold. Over-the-counter cough and cold medicines don't help any better than syrup with no medicine in it. They also can cause serious side effects, especially in babies under 2 years of age. Don't give OTC cough or cold medicines to children under 6 years unless your healthcare provider has specifically advised you to do so.  ? Keep your child away from cigarette smoke. It can make the cough worse. Don't let anyone smoke in your house or car.  · Nasal congestion. Suction the nose of babies with a bulb syringe. You may put 2 to 3 drops of saltwater (saline) nose drops in each nostril before suctioning. This helps thin and remove secretions. Saline nose drops are available without a prescription. You can also use 1/4 teaspoon of table salt dissolved in 1 cup of water.  · Fever. Use children’s acetaminophen for fever, fussiness, or discomfort, unless another medicine was prescribed. In babies over 6 months of age, you may use children’s ibuprofen or acetaminophen. If your child has chronic liver or kidney disease, talk with your child's healthcare provider before using these medicines. Also talk with the provider if your child has had a stomach ulcer or digestive bleeding. Never give aspirin to anyone younger than 18 years of age who is ill with a viral infection or fever. It may cause severe liver or brain damage.  · Preventing spread. Washing your hands before and after touching your sick child will help  prevent a new infection. It will also help prevent the spread of this viral illness to yourself and other children. In an age-appropriate manner, teach your children when, how, and why to wash their hands. Role model correct handwashing. Encourage adults in your home to wash hands often.    Follow-up care  Follow up with your healthcare provider, or as advised.  When to seek medical advice  For a usually healthy child, call your child's healthcare provider right away if any of these occur:   · A fever (see Fever and children, below)  · Earache, sinus pain, stiff or painful neck, headache, repeated diarrhea, or vomiting.  · Unusual fussiness.  · A new rash appears.  · Your child is dehydrated, with one or more of these symptoms:  ? No tears when crying.  ? “Sunken” eyes or a dry mouth.  ? No wet diapers for 8 hours in infants.  ? Reduced urine output in older children.  · Your child has new symptoms or you are worried or confused by your child's condition.  Call 911  Call 911 if any of these occur:   · Increased wheezing or difficulty breathing  · Unusual drowsiness or confusion  · Fast breathing:  ? Birth to 6 weeks: over 60 breaths per minute  ? 6 weeks to 2 years: over 45 breaths per minute  ? 3 to 6 years: over 35 breaths per minute  ? 7 to 10 years: over 30 breaths per minute  ? Older than 10 years: over 25 breaths per minute  Fever and children  Always use a digital thermometer to check your child’s temperature. Never use a mercury thermometer.   For infants and toddlers, be sure to use a rectal thermometer correctly. A rectal thermometer may accidentally poke a hole in (perforate) the rectum. It may also pass on germs from the stool. Always follow the product maker’s directions for proper use. If you don’t feel comfortable taking a rectal temperature, use another method. When you talk to your child’s healthcare provider, tell him or her which method you used to take your child’s temperature.   Here are  guidelines for fever temperature. Ear temperatures aren’t accurate before 6 months of age. Don’t take an oral temperature until your child is at least 4 years old.   Infant under 3 months old:  · Ask your child’s healthcare provider how you should take the temperature.  · Rectal or forehead (temporal artery) temperature of 100.4°F (38°C) or higher, or as directed by the provider  · Armpit temperature of 99°F (37.2°C) or higher, or as directed by the provider  Child age 3 to 36 months:  · Rectal, forehead (temporal artery), or ear temperature of 102°F (38.9°C) or higher, or as directed by the provider  · Armpit temperature of 101°F (38.3°C) or higher, or as directed by the provider  Child of any age:  · Repeated temperature of 104°F (40°C) or higher, or as directed by the provider  · Fever that lasts more than 24 hours in a child under 2 years old. Or a fever that lasts for 3 days in a child 2 years or older.  [employer] last reviewed this educational content on 6/1/2018  © 2012-5308 [employer]. All rights reserved. This information is not intended as a substitute for professional medical care. Always follow your healthcare professional's instructions.             Please check the results of your COVID PCR swab on the [employer] wilner. Please quarantine at home until your results are back. If your test is positive, you will be called by our staff in 1-2 days.  If your results are negative, you will not be called but can access the test on the [employer] wilner. If you test negative for COVID-- this means it is highly unlikely your symptoms are due to COVID 19. No test is 100% in diagnosing COVID. However, the COVID test you had is 97% accurate in detecting COVID. If you have minimal to no symptoms- you may return to work/school based on the guidelines set forth by your employer or school.

## 2023-08-02 DIAGNOSIS — J43.9 PULMONARY EMPHYSEMA, UNSPECIFIED EMPHYSEMA TYPE (HCC): ICD-10-CM

## 2023-08-03 RX ORDER — FLUTICASONE PROPIONATE AND SALMETEROL XINAFOATE 230; 21 UG/1; UG/1
2 AEROSOL, METERED RESPIRATORY (INHALATION) 2 TIMES DAILY
Qty: 36 G | Refills: 0 | OUTPATIENT
Start: 2023-08-03

## 2023-08-03 RX ORDER — FLUTICASONE PROPIONATE AND SALMETEROL XINAFOATE 230; 21 UG/1; UG/1
2 AEROSOL, METERED RESPIRATORY (INHALATION) 2 TIMES DAILY
Qty: 1 EACH | Refills: 3 | Status: SHIPPED | OUTPATIENT
Start: 2023-08-03

## 2023-08-03 NOTE — TELEPHONE ENCOUNTER
Refill passed per [facility] protocol.   Last office visit 05/29/2023     Requested Prescriptions   Pending Prescriptions Disp Refills    FLUTICASONE-SALMETEROL 230-21 MCG/ACT Inhalation Aerosol [Pharmacy Med Name: FLUTICASONE/SALM /21MCG 120S] 36 g 0     Sig: INHALE 2 PUFFS INTO THE LUNGS TWICE DAILY FOR ASTHMA       Asthma & COPD Medication Protocol Failed - 8/2/2023 12:31 PM        Failed - In person appointment or virtual visit in the past 6 mos or appointment in next 3 mos     Recent Outpatient Visits              1 month ago     [address], [facility], [place]    Nurse Only    2 months ago Tooth loss    John Marie MD    Office Visit    10 months ago Acute left-sided low back pain with left-sided sciatica    [address], [address], Katerina Schwab MD    Office Visit    1 year ago DON (generalized anxiety disorder)    Katerina Cortes MD    Telemedicine    1 year ago Need for vaccination    [facility], [address], [facility] [address], [address] [place], [place]    Office Visit                         Recent Outpatient Visits              1 month ago     [address], [facility],[address], [place]    Nurse Only    2 months ago Tooth loss    John Marie MD    Office Visit    10 months ago Acute left-sided low back pain with left-sided sciatica    [address], [address], Katerina Schwab MD    Office Visit    1 year ago DON (generalized anxiety disorder)    Katerina Cortes MD    Telemedicine    1 year ago Need for vaccination    [facility], [address], [facility], [address] [place], [place]    Office Visit

## 2023-08-10 DIAGNOSIS — E55.9 VITAMIN D DEFICIENCY: ICD-10-CM

## 2023-08-10 DIAGNOSIS — J43.9 PULMONARY EMPHYSEMA, UNSPECIFIED EMPHYSEMA TYPE (HCC): ICD-10-CM

## 2023-08-11 DIAGNOSIS — E78.2 MIXED HYPERLIPIDEMIA: ICD-10-CM

## 2023-08-11 DIAGNOSIS — I70.0 ATHEROSCLEROSIS OF AORTA (HCC): ICD-10-CM

## 2023-08-11 RX ORDER — FLUTICASONE PROPIONATE AND SALMETEROL XINAFOATE 230; 21 UG/1; UG/1
2 AEROSOL, METERED RESPIRATORY (INHALATION) 2 TIMES DAILY
Qty: 36 G | Refills: 0 | OUTPATIENT
Start: 2023-08-11

## 2023-08-11 RX ORDER — ROSUVASTATIN CALCIUM 10 MG/1
TABLET, COATED ORAL
Qty: 90 TABLET | Refills: 3 | OUTPATIENT
Start: 2023-08-11

## 2023-08-11 RX ORDER — ERGOCALCIFEROL 1.25 MG/1
50000 CAPSULE ORAL
Qty: 6 CAPSULE | Refills: 0 | Status: SHIPPED | OUTPATIENT
Start: 2023-08-11

## 2023-08-11 NOTE — TELEPHONE ENCOUNTER
Routed to Dr Elsy Martinez for Dr Ashlee Sheppard. Jairo Britt  for advise, thanks.         Please review refill protocol failed/ no protocol  Requested Prescriptions   Pending Prescriptions Disp Refills    ERGOCALCIFEROL 1.25 MG (66272 UT) Oral Cap [Pharmacy Med Name: VITAMIN D2 50,000IU (ERGO) CAP RX] 4 capsule 0     Sig: TAKE 1 CAPSULE BY MOUTH 1 TIME A WEEK       There is no refill protocol information for this order

## 2023-08-12 NOTE — TELEPHONE ENCOUNTER
Per chart review, rosuvastatin 20mg on current med list from 01/2023 x 1 yr supply. Requested Prescriptions   Pending Prescriptions Disp Refills    ROSUVASTATIN 10 MG Oral Tab [Pharmacy Med Name: ROSUVASTATIN 10MG TABLETS] 90 tablet 3     Sig: TAKE 1 TABLET(10 MG) BY MOUTH EVERY NIGHT FOR HIGH CHOLESTEROL.  STOP ATORVASTATIN       Cholesterol Medication Protocol Passed - 8/11/2023 12:11 PM        Passed - ALT in past 12 months        Passed - LDL in past 12 months        Passed - Last ALT < 80     Lab Results   Component Value Date    ALT 37 03/31/2023             Passed - Last LDL < 130     Lab Results   Component Value Date     (H) 03/31/2023             Passed - In person appointment or virtual visit in the past 12 mos or appointment in next 3 mos     Recent Outpatient Visits              1 month ago     [address], [address], [place]    Nurse Only    2 months ago Tooth loss    Beryle Downing, MD    Office Visit    10 months ago Acute left-sided low back pain with left-sided sciatica    [address], [address], Kimberley Torres MD    Office Visit    1 year ago DON (generalized anxiety disorder)    [address] [facility], Kimberley Torres MD    Telemedicine    1 year ago Need for vaccination    [address], [address], [address] [place], [employer]    Office Visit

## 2023-08-22 DIAGNOSIS — G89.4 CHRONIC PAIN SYNDROME: ICD-10-CM

## 2023-08-22 DIAGNOSIS — M54.16 LUMBAR RADICULOPATHY: ICD-10-CM

## 2023-08-23 RX ORDER — GABAPENTIN 400 MG/1
400 CAPSULE ORAL 4 TIMES DAILY
Qty: 360 CAPSULE | Refills: 2 | Status: SHIPPED | OUTPATIENT
Start: 2023-08-23

## 2023-08-23 NOTE — TELEPHONE ENCOUNTER
Refill passed per [facility] protocol.     Date Provider Department Primary Dx   3 months ago Nathan Moncada MD [facility], [facility] Tooth loss       Requested Prescriptions   Pending Prescriptions Disp Refills    GABAPENTIN 400 MG Oral Cap [Pharmacy Med Name: GABAPENTIN 400MG CAPSULES] 360 capsule 3     Sig: TAKE 1 CAPSULE(400 MG) BY MOUTH FOUR TIMES DAILY       Neurology Medications Failed - 8/22/2023 11:52 AM        PASSED - In person appointment or virtual visit in the past 6 mos or appointment in next 3 mos     Recent Outpatient Visits              1 month ago     [address], [address], [place]    Nurse Only    3 months ago Tooth loss    Sammi Larsen MD    Office Visit    10 months ago Acute left-sided low back pain with left-sided sciatica    [address] [facility], Stephanie Gomez MD    Office Visit    1 year ago DON (generalized anxiety disorder)    [address], [address], Stephanie Gomez MD    Telemedicine    1 year ago Need for vaccination    [address], [address], [address], [place], [place], [facility]    Office Visit                           Recent Outpatient Visits              1 month ago     [address], [address], [place]    Nurse Only    3 months ago Tooth loss    Sammi Larsen MD    Office Visit    10 months ago Acute left-sided low back pain with left-sided sciatica    [address] [facility], Stephanie Gomez MD    Office Visit    1 year ago DON (generalized anxiety disorder)    [address], [address], Stephanie Gomez MD    Telemedicine    1 year ago Need for vaccination    [address], [address], [address] [place], [facility]    Office Visit

## 2023-12-04 NOTE — TELEPHONE ENCOUNTER
FYI-- pt on EE home monitoring for positive covid. well developed, well nourished , in no acute distress , ambulating without difficulty , normal communication ability

## 2024-02-02 RX ORDER — IBUPROFEN 600 MG/1
600 TABLET ORAL EVERY 6 HOURS PRN
Qty: 300 TABLET | Refills: 0 | OUTPATIENT
Start: 2024-02-02

## 2024-11-11 ENCOUNTER — LAB ENCOUNTER (OUTPATIENT)
Dept: LAB | Age: 59
End: 2024-11-11
Attending: INTERNAL MEDICINE
Payer: MEDICARE

## 2024-11-11 ENCOUNTER — TELEPHONE (OUTPATIENT)
Dept: INTERNAL MEDICINE CLINIC | Facility: CLINIC | Age: 59
End: 2024-11-11

## 2024-11-11 ENCOUNTER — OFFICE VISIT (OUTPATIENT)
Facility: LOCATION | Age: 59
End: 2024-11-11

## 2024-11-11 ENCOUNTER — IMMUNIZATION (OUTPATIENT)
Dept: LAB | Age: 59
End: 2024-11-11
Attending: EMERGENCY MEDICINE
Payer: MEDICARE

## 2024-11-11 VITALS
BODY MASS INDEX: 33.75 KG/M2 | DIASTOLIC BLOOD PRESSURE: 80 MMHG | SYSTOLIC BLOOD PRESSURE: 138 MMHG | WEIGHT: 205 LBS | HEART RATE: 86 BPM | OXYGEN SATURATION: 98 % | HEIGHT: 65.5 IN

## 2024-11-11 DIAGNOSIS — Z23 NEED FOR VACCINATION: Primary | ICD-10-CM

## 2024-11-11 DIAGNOSIS — J43.9 PULMONARY EMPHYSEMA, UNSPECIFIED EMPHYSEMA TYPE (HCC): ICD-10-CM

## 2024-11-11 DIAGNOSIS — F41.1 GAD (GENERALIZED ANXIETY DISORDER): ICD-10-CM

## 2024-11-11 DIAGNOSIS — E78.2 MIXED HYPERLIPIDEMIA: ICD-10-CM

## 2024-11-11 DIAGNOSIS — F17.200 TOBACCO USE DISORDER: ICD-10-CM

## 2024-11-11 DIAGNOSIS — Z13.0 SCREENING FOR ENDOCRINE, NUTRITIONAL, METABOLIC AND IMMUNITY DISORDER: ICD-10-CM

## 2024-11-11 DIAGNOSIS — Z12.11 COLON CANCER SCREENING: ICD-10-CM

## 2024-11-11 DIAGNOSIS — Z63.4 BEREAVEMENT DUE TO LIFE EVENT: ICD-10-CM

## 2024-11-11 DIAGNOSIS — Z13.21 SCREENING FOR ENDOCRINE, NUTRITIONAL, METABOLIC AND IMMUNITY DISORDER: ICD-10-CM

## 2024-11-11 DIAGNOSIS — I70.0 ATHEROSCLEROSIS OF AORTA (HCC): ICD-10-CM

## 2024-11-11 DIAGNOSIS — Z00.00 ENCOUNTER FOR MEDICARE ANNUAL WELLNESS EXAM: ICD-10-CM

## 2024-11-11 DIAGNOSIS — M54.16 LUMBAR RADICULOPATHY: ICD-10-CM

## 2024-11-11 DIAGNOSIS — Z13.228 SCREENING FOR ENDOCRINE, NUTRITIONAL, METABOLIC AND IMMUNITY DISORDER: ICD-10-CM

## 2024-11-11 DIAGNOSIS — R45.89 ANXIETY ABOUT HEALTH: ICD-10-CM

## 2024-11-11 DIAGNOSIS — Z00.00 ENCOUNTER FOR MEDICARE ANNUAL WELLNESS EXAM: Primary | ICD-10-CM

## 2024-11-11 DIAGNOSIS — Z00.00 ANNUAL PHYSICAL EXAM: ICD-10-CM

## 2024-11-11 DIAGNOSIS — Z13.29 SCREENING FOR ENDOCRINE, NUTRITIONAL, METABOLIC AND IMMUNITY DISORDER: ICD-10-CM

## 2024-11-11 DIAGNOSIS — Z87.891 PERSONAL HISTORY OF NICOTINE DEPENDENCE: ICD-10-CM

## 2024-11-11 DIAGNOSIS — E55.9 VITAMIN D DEFICIENCY: ICD-10-CM

## 2024-11-11 DIAGNOSIS — Z12.31 ENCOUNTER FOR SCREENING MAMMOGRAM FOR BREAST CANCER: ICD-10-CM

## 2024-11-11 PROBLEM — G47.00 INSOMNIA: Status: RESOLVED | Noted: 2019-09-09 | Resolved: 2024-11-11

## 2024-11-11 LAB
ALBUMIN SERPL-MCNC: 4.5 G/DL (ref 3.2–4.8)
ALBUMIN/GLOB SERPL: 1.6 {RATIO} (ref 1–2)
ALP LIVER SERPL-CCNC: 138 U/L
ALT SERPL-CCNC: 66 U/L
ANION GAP SERPL CALC-SCNC: 8 MMOL/L (ref 0–18)
AST SERPL-CCNC: 40 U/L (ref ?–34)
BILIRUB SERPL-MCNC: 0.4 MG/DL (ref 0.2–1.1)
BILIRUB UR QL: NEGATIVE
BUN BLD-MCNC: 7 MG/DL (ref 9–23)
BUN/CREAT SERPL: 6.5 (ref 10–20)
CALCIUM BLD-MCNC: 10.4 MG/DL (ref 8.7–10.4)
CHLORIDE SERPL-SCNC: 110 MMOL/L (ref 98–112)
CHOLEST SERPL-MCNC: 181 MG/DL (ref ?–200)
CLARITY UR: CLEAR
CO2 SERPL-SCNC: 27 MMOL/L (ref 21–32)
CREAT BLD-MCNC: 1.08 MG/DL
EGFRCR SERPLBLD CKD-EPI 2021: 59 ML/MIN/1.73M2 (ref 60–?)
EST. AVERAGE GLUCOSE BLD GHB EST-MCNC: 123 MG/DL (ref 68–126)
GLOBULIN PLAS-MCNC: 2.8 G/DL (ref 2–3.5)
GLUCOSE BLD-MCNC: 104 MG/DL (ref 70–99)
GLUCOSE UR-MCNC: NORMAL MG/DL
HBA1C MFR BLD: 5.9 % (ref ?–5.7)
HDLC SERPL-MCNC: 38 MG/DL (ref 40–59)
HGB UR QL STRIP.AUTO: NEGATIVE
KETONES UR-MCNC: NEGATIVE MG/DL
LDLC SERPL CALC-MCNC: 112 MG/DL (ref ?–100)
LEUKOCYTE ESTERASE UR QL STRIP.AUTO: NEGATIVE
NITRITE UR QL STRIP.AUTO: NEGATIVE
NONHDLC SERPL-MCNC: 143 MG/DL (ref ?–130)
OSMOLALITY SERPL CALC.SUM OF ELEC: 298 MOSM/KG (ref 275–295)
PH UR: 5.5 [PH] (ref 5–8)
POTASSIUM SERPL-SCNC: 4.1 MMOL/L (ref 3.5–5.1)
PROT SERPL-MCNC: 7.3 G/DL (ref 5.7–8.2)
PROT UR-MCNC: NEGATIVE MG/DL
SODIUM SERPL-SCNC: 145 MMOL/L (ref 136–145)
SP GR UR STRIP: 1.01 (ref 1–1.03)
TRIGL SERPL-MCNC: 174 MG/DL (ref 30–149)
TSI SER-ACNC: 1.5 UIU/ML (ref 0.55–4.78)
UROBILINOGEN UR STRIP-ACNC: NORMAL
VIT D+METAB SERPL-MCNC: 43.7 NG/ML (ref 30–100)
VLDLC SERPL CALC-MCNC: 30 MG/DL (ref 0–30)

## 2024-11-11 PROCEDURE — 90656 IIV3 VACC NO PRSV 0.5 ML IM: CPT

## 2024-11-11 PROCEDURE — 83036 HEMOGLOBIN GLYCOSYLATED A1C: CPT | Performed by: INTERNAL MEDICINE

## 2024-11-11 PROCEDURE — 90471 IMMUNIZATION ADMIN: CPT

## 2024-11-11 PROCEDURE — 82306 VITAMIN D 25 HYDROXY: CPT | Performed by: INTERNAL MEDICINE

## 2024-11-11 PROCEDURE — 80061 LIPID PANEL: CPT | Performed by: INTERNAL MEDICINE

## 2024-11-11 PROCEDURE — 84443 ASSAY THYROID STIM HORMONE: CPT | Performed by: INTERNAL MEDICINE

## 2024-11-11 PROCEDURE — 36415 COLL VENOUS BLD VENIPUNCTURE: CPT | Performed by: INTERNAL MEDICINE

## 2024-11-11 PROCEDURE — 85060 BLOOD SMEAR INTERPRETATION: CPT | Performed by: INTERNAL MEDICINE

## 2024-11-11 PROCEDURE — 85027 COMPLETE CBC AUTOMATED: CPT | Performed by: INTERNAL MEDICINE

## 2024-11-11 PROCEDURE — 81003 URINALYSIS AUTO W/O SCOPE: CPT

## 2024-11-11 PROCEDURE — 80053 COMPREHEN METABOLIC PANEL: CPT | Performed by: INTERNAL MEDICINE

## 2024-11-11 RX ORDER — ALPRAZOLAM 0.5 MG
0.5 TABLET ORAL 2 TIMES DAILY PRN
Qty: 30 TABLET | Refills: 1 | Status: SHIPPED | OUTPATIENT
Start: 2024-11-11

## 2024-11-11 RX ORDER — ROSUVASTATIN CALCIUM 20 MG/1
20 TABLET, COATED ORAL NIGHTLY
Qty: 90 TABLET | Refills: 9 | Status: SHIPPED | OUTPATIENT
Start: 2024-11-11

## 2024-11-11 RX ORDER — FLUTICASONE PROPIONATE AND SALMETEROL XINAFOATE 230; 21 UG/1; UG/1
2 AEROSOL, METERED RESPIRATORY (INHALATION) 2 TIMES DAILY
Qty: 3 EACH | Refills: 3 | Status: SHIPPED | OUTPATIENT
Start: 2024-11-11 | End: 2024-11-11

## 2024-11-11 RX ORDER — TIOTROPIUM BROMIDE 18 UG/1
18 CAPSULE ORAL; RESPIRATORY (INHALATION) DAILY
Qty: 90 CAPSULE | Refills: 3 | Status: SHIPPED | OUTPATIENT
Start: 2024-11-11 | End: 2025-11-06

## 2024-11-11 SDOH — SOCIAL STABILITY - SOCIAL INSECURITY: DISSAPEARANCE AND DEATH OF FAMILY MEMBER: Z63.4

## 2024-11-11 NOTE — PROGRESS NOTES
Subjective:   Harlan Claudio is a 58 year old female who presents for a MA AHA (Medicare Advantage Annual Health Assessment) and scheduled follow up of multiple significant but stable problems.   1.  She has COPD, she has been having some worsening symptoms, she has been feeling a bit short of breath with some mucus production, fortunately the inhalers were not being refilled by the pharmacy.    2.  Her  recently passed away due to metastatic pancreatic cancer, she has been caring for him for the past few years, she has been feeling very stressed and sad as result, she feels very isolated.  She has not been able to sleep.    History/Other:   Fall Risk Assessment:   She has been screened for Falls and is low risk.      Cognitive Assessment:   She had a completely normal cognitive assessment - see flowsheet entries       Functional Ability/Status:   Harlan Claudio has some abnormal functions as listed below:  She has Dressing and/or Bathing issues based on screening of functional status.  Bathing or Showering: Able without help  Dressing: Able without help  She has Vision problems based on screening of functional status. She has Walking problems based on screening of functional status. She has problems with Daily Activities based on screening of functional status.       Depression Screening (PHQ):  PHQ-2 SCORE: 0  , done 11/11/2024             Advanced Directives:   She does NOT have a Living Will. [ ]  She does NOT have a Power of  for Health Care. [ ]  Discussed Advance Care Planning with patient (and family/surrogate if present). Standard forms made available to patient in After Visit Summary.      Patient Active Problem List   Diagnosis    Tobacco use disorder    Mixed hyperlipidemia    Atherosclerosis of aorta (HCC)    DON (generalized anxiety disorder)    Lumbar radiculopathy    Pulmonary emphysema (HCC)     Allergies:  She has No Known Allergies.    Current  Medications:  Outpatient Medications Marked as Taking for the 11/11/24 encounter (Office Visit) with Don Ramos MD   Medication Sig    sertraline 50 MG Oral Tab Take 0.5 tablets (25 mg total) by mouth daily for 6 days, THEN 1 tablet (50 mg total) daily. FOR ANXIETY. SEE ME IN 4 WEEKS FOR RE-EVALUATION/REFILLS. NO REFILLS ON THIS RX..    ALPRAZolam (XANAX) 0.5 MG Oral Tab Take 1 tablet (0.5 mg total) by mouth 2 (two) times daily as needed for Anxiety.    budeson-glycopyrrol-formoterol 160-9-4.8 MCG/ACT Inhalation Aerosol Inhale 2 puffs into the lungs 2 (two) times daily. FOR COPD/ASTHMA.    tiotropium (SPIRIVA HANDIHALER) 18 MCG Inhalation Cap Inhale 1 capsule (18 mcg total) into the lungs daily.    fluticasone-salmeterol 230-21 MCG/ACT Inhalation Aerosol Inhale 2 puffs into the lungs 2 (two) times daily.    rosuvastatin 20 MG Oral Tab Take 1 tablet (20 mg total) by mouth nightly. FOR CHOLESTEROL.    gabapentin 400 MG Oral Cap Take 1 capsule (400 mg total) by mouth 4 (four) times daily.    ipratropium-albuterol 0.5-2.5 (3) MG/3ML Inhalation Solution Take 3 mL by nebulization every 4 (four) hours as needed.    Albuterol Sulfate  (90 Base) MCG/ACT Inhalation Aero Soln Inhale 2 puffs into the lungs every 6 (six) hours as needed for Wheezing or Shortness of Breath.       Medical History:  She  has a past medical history of Diabetes (HCC), Hyperlipidemia, Left wrist fracture, Lipid screening (10-), Obesity, unspecified, Other ill-defined conditions(799.89) (2012), Right ACL tear (2011), Rotator cuff tear (11/2009), Smoking addiction, and Type II diabetes mellitus, uncontrolled (10/4/2010).  Surgical History:  She  has a past surgical history that includes musculoskeletal surgery unlisted (Right, 11/2009) and knee arthroscopy.   Family History:  Her family history includes Cancer (age of onset: 61) in her sister; Dementia (age of onset: 78) in her mother; Dementia (age of onset: 85) in her father;  Depression (age of onset: 48) in her sister; Diabetes (age of onset: 78) in her mother; Heart Disease (age of onset: 78) in her mother; Other (age of onset: 51) in her brother.  Social History:  She  reports that she has been smoking cigarettes. She has a 20 pack-year smoking history. She has never used smokeless tobacco. She reports that she does not drink alcohol and does not use drugs.    Tobacco:  Social History     Tobacco Use   Smoking Status Every Day    Current packs/day: 1.00    Average packs/day: 1 pack/day for 20.0 years (20.0 ttl pk-yrs)    Types: Cigarettes   Smokeless Tobacco Never     E-Cigarettes/Vaping       Questions Responses    E-Cigarette Use Never User          E-Cigarette/Vaping Substances       Questions Responses    Nicotine No    THC No    CBD No    Flavoring No          E-Cigarette/Vaping Devices       Questions Responses    Disposable No    Pre-filled or Refillable Cartridge No    Refillable Tank No    Pre-filled Pod No           Tobacco cessation counseling for <3 minutes.      CAGE Alcohol Screen:   Cage screening not needed because reported NO to Alcohol use on social history.      Patient Care Team:  Don Ramos MD as PCP - General (Internal Medicine)  Laurence Taylor (Physical Therapy)    Review of Systems   Constitutional:  Negative for unexpected weight change.   HENT:  Negative for hearing loss.    Eyes:  Negative for pain and visual disturbance.   Respiratory:  Negative for shortness of breath.    Cardiovascular:  Negative for chest pain, palpitations and leg swelling.   Gastrointestinal:  Negative for abdominal pain and blood in stool.   Genitourinary:  Negative for difficulty urinating and hematuria.   Neurological:  Negative for tremors and syncope.   Psychiatric/Behavioral: Negative.            Objective:   Physical Exam  Vitals and nursing note reviewed.   Constitutional:       General: She is not in acute distress.     Appearance: Normal appearance.   HENT:      Head:  Normocephalic.      Right Ear: External ear normal.      Left Ear: External ear normal.   Eyes:      Extraocular Movements: Extraocular movements intact.      Conjunctiva/sclera: Conjunctivae normal.   Cardiovascular:      Rate and Rhythm: Normal rate and regular rhythm.      Pulses: Normal pulses.      Heart sounds: Normal heart sounds.   Pulmonary:      Effort: Pulmonary effort is normal. No respiratory distress.      Breath sounds: Normal breath sounds. No wheezing.   Abdominal:      General: Abdomen is flat. Bowel sounds are normal.      Tenderness: There is no abdominal tenderness.   Musculoskeletal:         General: Normal range of motion.      Cervical back: Normal range of motion and neck supple.   Skin:     Coloration: Skin is not jaundiced.   Neurological:      General: No focal deficit present.      Mental Status: She is alert and oriented to person, place, and time. Mental status is at baseline.   Psychiatric:         Mood and Affect: Mood normal.         Behavior: Behavior normal.           /80   Pulse 86   Ht 5' 5.5\" (1.664 m)   Wt 205 lb (93 kg)   SpO2 98%   Breastfeeding No   BMI 33.59 kg/m²  Estimated body mass index is 33.59 kg/m² as calculated from the following:    Height as of this encounter: 5' 5.5\" (1.664 m).    Weight as of this encounter: 205 lb (93 kg).    Medicare Hearing Assessment:   Hearing Screening    Screening Method: Whisper Test  Whisper Test Result: Pass         Visual Acuity:   Right Eye Visual Acuity: Corrected Right Eye Chart Acuity: 20/20   Left Eye Visual Acuity: Corrected Left Eye Chart Acuity: 20/20   Both Eyes Visual Acuity: Corrected Both Eyes Chart Acuity: 20/20   Able To Tolerate Visual Acuity: Yes        Assessment & Plan:   Harlan Claudio is a 58 year old female who presents for a Medicare Assessment.     1. Encounter for Medicare annual wellness exam (Primary)  -     Urinalysis with Culture Reflex; Future; Expected date: 11/11/2024  Plan  As  above    2. Pulmonary emphysema, unspecified emphysema type (HCC)  -     Budeson-Glycopyrrol-Formoterol; Inhale 2 puffs into the lungs 2 (two) times daily. FOR COPD/ASTHMA.  Dispense: 3 each; Refill: 5  -     Tiotropium Bromide Monohydrate; Inhale 1 capsule (18 mcg total) into the lungs daily.  Dispense: 90 capsule; Refill: 3  -     Fluticasone-Salmeterol; Inhale 2 puffs into the lungs 2 (two) times daily.  Dispense: 3 each; Refill: 3  Plan  Check if triple inhaler is covered, if so start, if not continue advair + spiriva. If symptoms worsen call office for doxy + prednisone.     3. Atherosclerosis of aorta (HCC)  -     Rosuvastatin Calcium; Take 1 tablet (20 mg total) by mouth nightly. FOR CHOLESTEROL.  Dispense: 90 tablet; Refill: 9  Plan  Chronic, well controlled on current medications as noted in medications, denies major adverse effects, continue with present management, continue to monitor labs.     4. Screening for endocrine, nutritional, metabolic and immunity disorder  -     Comp Metabolic Panel (14)  -     Hemoglobin A1C  -     CBC, Platelet; No Differential  -     Lipid Panel  -     TSH W Reflex To Free T4  -     Vitamin D  Plan  As above    5. Annual physical exam  -     Comp Metabolic Panel (14)  -     Hemoglobin A1C  -     CBC, Platelet; No Differential  -     Lipid Panel  -     TSH W Reflex To Free T4  Plan  As above    6. Vitamin D deficiency  -     Vitamin D  Plan  As above    7. Colon cancer screening  -     [facility] GI Telephone Colon Screen  -     GASTRO - INTERNAL  Plan  As above    8. Encounter for screening mammogram for breast cancer  -     [facility] CHOLO 2D+3D SCREENING BILAT (CPT=77067/70523); Future; Expected date: 11/11/2024  Plan  As above    9. Personal history of nicotine dependence  -     Initial CT Lung Cancer screening (CPT=71271)- Please Answer Questions, Pack years and Smoking status; Future; Expected date: 11/11/2024  -     Shared Decision Making visit for Medicare for Lung Cancer CT  screening  Plan  As above    10. Anxiety about health  -     Sertraline HCl; Take 0.5 tablets (25 mg total) by mouth daily for 6 days, THEN 1 tablet (50 mg total) daily. FOR ANXIETY. SEE ME IN 4 WEEKS FOR RE-EVALUATION/REFILLS. NO REFILLS ON THIS RX..  Dispense: 90 tablet; Refill: 0  -     ALPRAZolam; Take 1 tablet (0.5 mg total) by mouth 2 (two) times daily as needed for Anxiety.  Dispense: 30 tablet; Refill: 1  Plan  Much of this is bereavement however she has been dealing with this for the better part of a year and a half upwards of 2 years, she started to become more isolated, she does have a good support system, for now we will use some limited dose Xanax to help her with the acute symptoms and hopefully sleep, I have encouraged her to start sertraline, instructions in AVS.    11. Bereavement due to life event  -     ALPRAZolam; Take 1 tablet (0.5 mg total) by mouth 2 (two) times daily as needed for Anxiety.  Dispense: 30 tablet; Refill: 1  Plan  As above    12. Mixed hyperlipidemia  -     Rosuvastatin Calcium; Take 1 tablet (20 mg total) by mouth nightly. FOR CHOLESTEROL.  Dispense: 90 tablet; Refill: 9  Plan  Chronic, well controlled on current medications as noted in medications, denies major adverse effects, continue with present management, continue to monitor labs.     13. Lumbar radiculopathy  Plan  Stable, no complaints    14. DON (generalized anxiety disorder)  -     Sertraline HCl; Take 0.5 tablets (25 mg total) by mouth daily for 6 days, THEN 1 tablet (50 mg total) daily. FOR ANXIETY. SEE ME IN 4 WEEKS FOR RE-EVALUATION/REFILLS. NO REFILLS ON THIS RX..  Dispense: 90 tablet; Refill: 0  -     ALPRAZolam; Take 1 tablet (0.5 mg total) by mouth 2 (two) times daily as needed for Anxiety.  Dispense: 30 tablet; Refill: 1  Plan  As above    15. Tobacco use disorder  Plan  Encouraged to quit smoking.    The patient indicates understanding of these issues and agrees to the plan.  Continue with current treatment  plan.  Further testing ordered.  Imaging studies ordered.  Lab work ordered.  Patient reassured.  Reinforced healthy diet, lifestyle, and exercise.      No follow-ups on file.     Don Ramos MD, 11/11/2024     Supplementary Documentation:   General Health:       Health Maintenance   Topic Date Due    Colorectal Cancer Screening  Never done    Pap Smear  Never done    MA Annual Health Assessment  01/01/2024    Annual Depression Screening  01/01/2024    Tobacco Cessation Counseling  01/01/2024    DTaP,Tdap,and Td Vaccines (2 - Td or Tdap) 01/21/2024    Mammogram  06/09/2024    Influenza Vaccine (1) 10/01/2024    Pneumococcal Vaccine: Birth to 64yrs (3 of 3 - PPSV23 or PCV20) 11/19/2028    Zoster Vaccines  Completed    COVID-19 Vaccine  Completed

## 2024-11-11 NOTE — TELEPHONE ENCOUNTER
Carlee/pharmacist with [facility] called, she would like to verify what inhaler patient should be taking: fluticasone-salmeterol 230-21 MCG/ACT Inhalation Aerosol OR budeson-glycopyrrol-formoterol 160-9-4.8 MCG/ACT Inhalation Aerosol OR tiotropium (SPIRIVA HANDIHALER) 18 MCG Inhalation Cap. I made her aware I will convey the above to Dr. Ramos for his clarification. She verbalized understanding. No further questions or concerns at this time.    Dr. Ramos - please clarify which inhaler patient should be taking

## 2024-11-11 NOTE — PATIENT INSTRUCTIONS
I am going to send you some alprazolam known as Xanax and you make take this once or twice daily as needed to help with your anxiety but for the next few nights I would like for you to take it to help you sleep.  It either this week or into the weekend depending on how you are doing I would like for you to at least  the sertraline known as Zoloft and keep it next your toothbrush and sometime this weekend consider starting it.  The first goal is to get you into a more levelheaded state with sleep and some Xanax and then adding in the sertraline over the next couple days to help even you out long-term and the goal is that we will not need to keep using the Xanax.    If at the end of the day you are very rarely using the Xanax and it is helping and you are able to cope and get through this you do not have to start taking the sertraline but I want you to keep it in your home because the moment you feel as though your anxiety is daily that is when you start taking the sertraline.  When you feel it is not controllable you start taking the sertraline.  Sertraline is a short-term and if you are unsure let me know and we can talk through it.    Pick Brezti OR advair+ spiriva.

## 2024-11-11 NOTE — TELEPHONE ENCOUNTER
Whichever is the cheapest. (pharmacist should be able to figure this out; brezti = advair + spiriva so these are per guideline protocols set forth by pharmacy).     Patient has instructions in AVS as well: Pick Brezti OR advair+ spiriva. Patient can discuss with her pharmacist, will send patient a [employer] message.     jackson

## 2024-11-12 LAB
DEPRECATED RDW RBC AUTO: 46.3 FL (ref 35.1–46.3)
ERYTHROCYTE [DISTWIDTH] IN BLOOD BY AUTOMATED COUNT: 14.5 % (ref 11–15)
HCT VFR BLD AUTO: 49.2 %
HGB BLD-MCNC: 16.5 G/DL
MCH RBC QN AUTO: 29.1 PG (ref 26–34)
MCHC RBC AUTO-ENTMCNC: 33.5 G/DL (ref 31–37)
MCV RBC AUTO: 86.8 FL
PLATELET # BLD AUTO: 220 10(3)UL (ref 150–450)
RBC # BLD AUTO: 5.67 X10(6)UL
WBC # BLD AUTO: 10.1 X10(3) UL (ref 4–11)

## 2024-11-12 NOTE — TELEPHONE ENCOUNTER
Called [facility] pharmacist Rashid and advised of Dr Ramos's note. Indicated that the Spiriva inhaler is the cheapest. Will cancel the other inhalers. Medication list updated as well.     Left message to call back-transfer to triage.  MyChart message sent.

## 2024-11-26 ENCOUNTER — NURSE ONLY (OUTPATIENT)
Facility: CLINIC | Age: 59
End: 2024-11-26

## 2024-11-26 DIAGNOSIS — Z12.11 SCREENING FOR COLON CANCER: Primary | ICD-10-CM

## 2024-11-26 NOTE — PROGRESS NOTES
Called patient for scheduled telephone colon screening/positive FIT result.   Medications, pharmacy, and allergies verified with the patient.   Please advise on colonoscopy and bowel prep orders.     Age 45-66 y/o (Y/N):   66-74 y/o ? Route to GI provider per rotation schedule   › GI MD preference:   › Insurance:  [facility]  › Last PCP Visit: 11/11/2024 with Don Mathew  IF NO PCP within [facility] ? GI in-person consultation   › Last CBC drawn: 11/11/2024  › Date of positive FIT test: N/A  › H/W/BMI: 5'5\"/205 lb/33.5    Special comments/notes:  Telephone Colon Screening Questionnaire Yes No   Are you currently experiencing any new/abnormal GI symptoms? [] [x]   If yes, explain:     Rectal bleeding? [] [x]   Black stool? [] [x]   Dysphagia or food \"feeling stuck\" when eating? [] [x]   Intractable vomiting? [] [x]   Unexplained weight loss? [] [x]   First colonoscopy? [x] []   Family history of colon cancer? [] [x]   Any issues with anesthesia? [] [x]   If yes, explain:      Have you had a stroke, heart attack or stent placement in the last 12 months?  [] [x]   If yes ? GI in-person consultation      Personal history of resp. Issues/oxygen use/SHYANN/COPD [] [x]   If yes, CPAP/BiPAP?     History of devices (pacemaker/defibrillator) [] [x]   History of cardiac/CVA issues/(MI/stroke) (see above) [] [x]     Medications Yes  No   Anticoagulants  Anticoagulant (Except Aspirin) ? route to RN pool to request adjustments from prescribing provider  [] [x]   Diabetic Meds  PO ? hold DAY PRIOR and DAY OF procedure  Insulin ? route to RN pool to request adjustments from prescribing provider  [] [x]   Weight loss meds (Phentermine/Vyvanse/Saxsenda/etc): [] [x]   Iron/herbal/multivitamin supplement (RX/OTC): [] [x]   Usage of marijuana, CBD &/or vape products: [] [x]            
GI Staff:  [facility] Colon Screening Orders    Please schedule: Colonoscopy 21219 with MAC OR IV (if appropriate)    Please send split dose Golytely bowel prep     Diagnosis: Colon Screening Z12.11 /      Medication adjustments: None       >>>Please inform patient if new medications are started after scheduling procedure they need to call clinic to notify us.    
Scheduled for:  Colonoscopy 96392  Provider Name:  Dr iKng  Date:  4/21/2025  Location:  [facility]  Sedation:  MAC  Time:  3:00 pm. (pt is aware that ENDO will call the day before the procedure to confirm arrival time)  Prep:  Golytely. Bowel prep was sent to pharmacy on file.  Meds/Allergies Reconciled?:  yes  Diagnosis with codes:    CRC screening Z12.11  Was patient informed to call insurance with codes (Y/N):  Yes  Referral sent?:  Referral was sent at the time of electronic surgical scheduling.  [facility] or [facility] notified?:  I sent an electronic request to Endo Scheduling and received a confirmation today.  Medication Orders:  she  Misc Orders:  N/A   Further instructions given by staff:  I discussed the prep instructions with the patient which she verbally understood.Patient is aware I will be sending prep instructions via My Chart.  
Franki Perdue  (RN)  2020 06:49:45

## 2025-02-08 DIAGNOSIS — F41.1 GAD (GENERALIZED ANXIETY DISORDER): ICD-10-CM

## 2025-02-08 DIAGNOSIS — R45.89 ANXIETY ABOUT HEALTH: ICD-10-CM

## 2025-02-12 NOTE — TELEPHONE ENCOUNTER
Please review; protocol failed/ has no protocol    Requested Prescriptions   Pending Prescriptions Disp Refills    SERTRALINE 50 MG Oral Tab [Pharmacy Med Name: SERTRALINE 50MG TABLETS] 90 tablet 0     Sig: TAKE HALF TABLET BY MOUTH EVERY DAY X 6 DAYS THEN 1 TABLET BY MOUTH EVERY DAY       Psychiatric Non-Scheduled (Anti-Anxiety) Failed - 2/12/2025  2:13 PM        Failed - Medication is active on med list        Passed - In person appointment or virtual visit in the past 6 mos or appointment in next 3 mos     Recent Outpatient Visits              2 months ago Screening for colon [facility]    [facility]    Nurse Only    3 months ago Encounter for Medicare annual wellness exam    [facility] Don Ramos MD    Office Visit    1 year ago     [facility] [place]    Nurse Only    1 year ago Tooth loss    [facility]    Office Visit    2 years ago Acute left-sided low back pain with left-sided sciatica    [facility]    [facility]          Future Appointments         Provider Department Appt Notes    In 2 months TUSHAR, PROCEDURE [facility] [place] [place] Colonoscopy w/MAC @ [facility]                    Passed - Depression Screening completed within the past 12 months           Recent Outpatient Visits              2 months ago Screening for colon cancer    [facility]    Nurse Only    3 months ago Encounter for Medicare annual wellness exam    [facility] Don Ramos MD    Office Visit    1 year ago     [facility]    Nurse Only    1 year ago [facility]    Office Visit    2 years ago Acute left-sided low back pain with left-sided sciatica    [facility] Don Ramos MD    Office Visit          Future Appointments         Provider Department Appt Notes    In 2 months TUSHAR, LINNETTE [facility] [place] Colonoscopy w/MAC @ [facility]

## 2025-02-14 RX ORDER — IBUPROFEN 600 MG/1
TABLET, FILM COATED ORAL
Qty: 300 TABLET | Refills: 0 | OUTPATIENT
Start: 2025-02-14

## 2025-02-15 ENCOUNTER — PATIENT MESSAGE (OUTPATIENT)
Facility: LOCATION | Age: 60
End: 2025-02-15

## 2025-02-15 DIAGNOSIS — I70.0 ATHEROSCLEROSIS OF AORTA: ICD-10-CM

## 2025-02-15 DIAGNOSIS — E78.2 MIXED HYPERLIPIDEMIA: ICD-10-CM

## 2025-02-16 NOTE — TELEPHONE ENCOUNTER
Please advise on request.  Pended.      IBUPROFEN 600 MG Oral Tab (Discontinued) 300 tablet 0 10/10/2022 11/11/2024    Sig: TAKE 1 TABLET(600 MG) BY MOUTH EVERY 8 HOURS AS NEEDED FOR PAIN    Sent to pharmacy as: Ibuprofen 600 MG Oral Tablet (Motrin)    E-Prescribing Status: Receipt confirmed by pharmacy (10/10/2022 11:25 AM CDT)      This Order Has Been Discontinued    Order Status Reason By On   Discontinued None Don Ramos MD 11/11/24 3809

## 2025-02-17 RX ORDER — IBUPROFEN 600 MG/1
600 TABLET, FILM COATED ORAL EVERY 6 HOURS PRN
Qty: 100 TABLET | Refills: 0 | Status: SHIPPED | OUTPATIENT
Start: 2025-02-17

## 2025-02-17 NOTE — TELEPHONE ENCOUNTER
Taking ibuprofen and sertraline together can cause GI upset and even GI bleeding, please inform patient.

## 2025-02-19 NOTE — TELEPHONE ENCOUNTER
Refill passed per [facility] protocol.    Former Dr. Ramos patient;  Medication pended for your review / approval  Will need new RX     Requested Prescriptions   Pending Prescriptions Disp Refills    rosuvastatin 20 MG Oral Tab 90 tablet 9     Sig: Take 1 tablet (20 mg total) by mouth nightly. FOR CHOLESTEROL.       Cholesterol Medication Protocol Passed - 2/19/2025  3:37 PM        Passed - ALT < 80     Lab Results   Component Value Date    ALT 66 (H) 11/11/2024             Passed - ALT resulted within past year        Passed - Lipid panel within past 12 months     Lab Results   Component Value Date    CHOLEST 181 11/11/2024    TRIG 174 (H) 11/11/2024    HDL 38 (L) 11/11/2024     (H) 11/11/2024    VLDL 30 11/11/2024    NONHDLC 143 (H) 11/11/2024             Passed - In person appointment or virtual visit in the past 12 mos or appointment in next 3 mos     Recent Outpatient Visits              2 months ago Screening for colon cancer    [facility]    Nurse Only    3 months ago Encounter for Medicare annual wellness exam    [facility] Don Ramos MD    Office Visit    1 year ago     [facility]    Nurse Only    1 year ago Tooth loss    [facility] Darryl Fajardo MD    Office Visit    2 years ago Acute left-sided low back pain with left-sided sciatica    [facility] Don Ramos MD    Office Visit          Future Appointments         Provider Department Appt Notes    In 2 months LINNETTE FINLEY [facility] Colonoscopy w/MAC @ [facility]                    Passed - Medication is active on med list           Future Appointments         Provider Department Appt Notes    In 2 months TUSHAR[address]NETTE [facility]  [place] Colonoscopy w/MAC @ [facility]          Recent Outpatient Visits              2 months ago Screening for colon cancer    [facility] [place]    Nurse Only    3 months ago Encounter for Medicare annual wellness exam    [facility] Don Ramos MD    Office Visit    1 year ago     [facility] [place]    Nurse Only    1 year ago Tooth loss    [facility] Darryl Fajardo MD    Office Visit    2 years ago Acute left-sided low back pain with left-sided sciatica    [facility] [place] Don Ramos MD    Office Visit

## 2025-02-20 RX ORDER — ROSUVASTATIN CALCIUM 20 MG/1
20 TABLET, COATED ORAL NIGHTLY
Qty: 90 TABLET | Refills: 3 | Status: SHIPPED | OUTPATIENT
Start: 2025-02-20

## 2025-02-20 NOTE — TELEPHONE ENCOUNTER
Refill sent to pharmacy. Follow up with Dr Powers at [place] or any physician in group at [place], [place], [place].

## 2025-02-21 NOTE — TELEPHONE ENCOUNTER
Please contact patient to schedule an office visit to establish care       Shu Hennessy, FINESSE       2/20/25 12:25 PM  Note     Refill sent to pharmacy. Follow up with Dr Powers at [place] or any physician in group at [place], [place], [place].

## 2025-02-24 NOTE — TELEPHONE ENCOUNTER
She is thinking about which pcp to choose; she said she will stick with ALONSO Hennessy for now. Her son, and parents, all were going to Dr Ramos. They will think about who to pick. She lost her spouse to pancreatic cancer recently.

## 2025-03-25 ENCOUNTER — TELEPHONE (OUTPATIENT)
Facility: LOCATION | Age: 60
End: 2025-03-25

## 2025-03-25 NOTE — TELEPHONE ENCOUNTER
Please relay to patient or Listed contact if unable to reach:  Monie Ramos is unfortunately no longer with our organization,  My name is Dr. Powers and I'm located in [facility] and assigned to his former patients thru [place].   I am reaching out in regards to remind you that  you are due [facility] visit with New Physician    Please complete this within the next month as Dr. Powers values providing high value evidence based medical care and prevention and would like to go over the next steps needed In your wellness endeavor.     However if you have established care with another provider please call our office [phone] or send us a [employer] message and we will remove your name from our list to indicate you have a new provider and will gladly send records to them if requested.      Wish you all the best in your endeavor for better health  -Dr. Pepe Powers MD, MPH

## 2025-04-08 NOTE — TELEPHONE ENCOUNTER
Outgoing call to the patient today, 4/8/2025    Patient was called. No answer. A voicemail was left for the patient.   No arrival information was left.  Left message for patient to call [phone] for arrival information.    Confirming surgery/procedure with Olga Dubois MD  Parking lot # 1  Entryway # 1  Surgery/Procedure date - 4/11/2025  Arrival time - 6:45 AM    No covid screening scheduled due to new hospital protocol  Explained where to register for surgery/procedure.  Patient confirms and understands.  Denies any questions at this time.  As of now, two visitors can accompany patient to surgery/procedure.    Patients are allowed two visitors while admitted to the hospital.          Incoming call from patient, confirming the above arrival information on 04/08/2025.     Please review; protocol failed.     Requested Prescriptions     Pending Prescriptions Disp Refills   • GABAPENTIN 400 MG Oral Cap [Pharmacy Med Name: GABAPENTIN 400MG CAPSULES] 360 capsule 0     Sig: TAKE ONE CAPSULE BY MOUTH FOUR TIMES DAILY   • DICLOFENAC

## 2025-04-16 ENCOUNTER — TELEPHONE (OUTPATIENT)
Facility: CLINIC | Age: 60
End: 2025-04-16

## 2025-04-16 DIAGNOSIS — Z12.11 SCREEN FOR COLON CANCER: Primary | ICD-10-CM

## 2025-04-16 NOTE — TELEPHONE ENCOUNTER
Canceled per patient  for:  Colonoscopy [address]  Provider Name:  Dr King  Date:  4/21/2025  Location:  [facility]  Sedation:  MAC  Time:  3:00 pm. (pt is aware that ENDO will call the day before the procedure to confirm arrival time)  Prep:  Golytely. Bowel prep was sent to pharmacy on file.  Meds/Allergies Reconciled?:  yes  Diagnosis with codes:    CRC screening Z12.11    Canceled in epic and endo

## 2025-06-11 ENCOUNTER — TELEPHONE (OUTPATIENT)
Dept: INTERNAL MEDICINE CLINIC | Facility: CLINIC | Age: 60
End: 2025-06-11

## 2025-06-11 NOTE — TELEPHONE ENCOUNTER
Please relay to patient or Listed contact if unable to reach:  Monie Ramos is unfortunately no longer with our organization,  My name is Dr. Powers and I'm located in [facility] and assigned to his former patients thru [place].   I am reaching out in regards to remind you that  you are due forAnnual Physical Exam, Colorectal cancer screening: given your age of greater than 45 I highly recommend you get your colorectal cancer screening if you decline screening colonoscopy a non invasive option is a stool Cologuard test which is done every three years, For average risk patients. Please follow up in clinic and we can discuss which option is best for you. I strive for delivering High quality/value care to my patients. If you have any questions, please schedule follow up with me  If you have already had a colonoscopy or Cologuard recently completed, please send Fax the records to our office [phone] However if you have established care with another provider please call our office [phone] or send us a [employer] message and we will remove your name from our list to indicate you have a new provider and will gladly send records to them if requested. for more information,  A Video on how to collect the cologuard sample on youtube: titled: How to properly use Cologuard colon cancer at-home test kit https://www.[employer] Connect.com/watch?v=IyudkXuThhI, and Annual Mammogram, Please schedule follow up/establish visit and will order if not already done     Please complete this within the next month as I value providing high value evidence based medical care and prevention and would like to go over the next steps needed In your wellness endeavor.     However if you have established care with another provider please call our office [phone] or send us a [employer] message and we will remove your name from our list to indicate you have a new provider and will gladly send records  to them if requested.      Wish you all the best in your endeavor for better health  -Dr. Pepe Powers MD, MPH

## 2025-06-12 NOTE — TELEPHONE ENCOUNTER
Spoke with pt name and  verifed , patient expressed she is going through a lot at this moment with family . Is thinking about staying with endeavor. Advised patient to let us know if she does decided to go elsewhere and we will removed from pcp/registry

## 2025-08-15 RX ORDER — IBUPROFEN 600 MG/1
600 TABLET, FILM COATED ORAL EVERY 6 HOURS PRN
Qty: 100 TABLET | Refills: 0 | OUTPATIENT
Start: 2025-08-15

## (undated) DIAGNOSIS — E55.9 VITAMIN D DEFICIENCY: ICD-10-CM

## (undated) NOTE — LETTER
STACEY. Notifier: [employer]   KARMA. Patient Name: Trinity CALVIN Identification Number: [id number]      Advance Beneficiary Notice of Noncoverage (ABN)  NOTE:  If Medicare doesn’t pay for DLeft shoulder injection.   below, you may have t ? OPTION 2. I want the D. Left shoulder injection  listed above, but do not bill Medicare. You may ask to be paid now as I am responsible for payment. I cannot appeal if Medicare is not billed. ? OPTION 3. I don’t want the D. Left shoulder injection  list

## (undated) NOTE — LETTER
April 14, 2022    [facility] [address] [facility] [address]      Dear Betty Cortez: The following are the results of your recent tests. Please review the list of test results. Your result is the value on the left; we have also supplied the range of normal (low and high) values. Results for orders placed or performed in visit on 04/04/22   HEMOGLOBIN A1C   Result Value Ref Range    HgbA1C 5.8 (H) <5.7 %    Estimated Average Glucose 120 68 - 126 mg/dL   LIPID PANEL   Result Value Ref Range    Cholesterol, Total 221 (H) <200 mg/dL    HDL Cholesterol 42 40 - 59 mg/dL    Triglycerides 177 (H) 30 - 149 mg/dL    LDL Cholesterol 147 (H) <100 mg/dL    VLDL 34 (H) 0 - 30 mg/dL    Non HDL Chol 179 (H) <130 mg/dL    Patient Fasting for Lipid? Yes    COMP METABOLIC PANEL (14)   Result Value Ref Range    Glucose 98 70 - 99 mg/dL    Sodium 141 136 - 145 mmol/L    Potassium 4.3 3.5 - 5.1 mmol/L    Chloride 108 98 - 112 mmol/L    CO2 25.0 21.0 - 32.0 mmol/L    Anion Gap 8 0 - 18 mmol/L    BUN 8 7 - 18 mg/dL    Creatinine 1.00 0.55 - 1.02 mg/dL    BUN/CREA Ratio 8.0 (L) 10.0 - 20.0    Calcium, Total 9.9 8.5 - 10.1 mg/dL    Calculated Osmolality 290 275 - 295 mOsm/kg    GFR, Non- 62 >=60    GFR, -American 72 >=60    ALT 27 13 - 56 U/L    AST 16 15 - 37 U/L    Alkaline Phosphatase 163 (H) 46 - 118 U/L    Bilirubin, Total 0.5 0.1 - 2.0 mg/dL    Total Protein 7.5 6.4 - 8.2 g/dL    Albumin 4.0 3.4 - 5.0 g/dL    Globulin  3.5 2.8 - 4.4 g/dL    A/G Ratio 1.1 1.0 - 2.0    Patient Fasting for CMP?  Yes    CBC, PLATELET; NO DIFFERENTIAL   Result Value Ref Range    WBC 9.6 4.0 - 11.0 x10(3) uL    RBC 5.66 (H) 3.80 - 5.30 x10(6)uL    HGB 16.4 (H) 12.0 - 16.0 g/dL    HCT 49.1 (H) 35.0 - 48.0 %    MCV 86.7 80.0 - 100.0 fL    MCH 29.0 26.0 - 34.0 pg    MCHC 33.4 31.0 - 37.0 g/dL    RDW 14.1 11.0 - 15.0 %    RDW-SD 45.4 35.1 - 46.3 fL    .0 150.0 - 450.0 10(3)uL   TSH W REFLEX TO FREE T4 Result Value Ref Range    TSH 1.350 0.358 - 3.740 mIU/mL   VITAMIN D, 25-HYDROXY   Result Value Ref Range    Vitamin D, 25OH, Total 38.5 30.0 - 100.0 ng/mL       Please call if you have further questions,    Kind regards,     Nursing Staff of Dr. Aarti White  [phone]

## (undated) NOTE — LETTER
AUTHORIZATION FOR SURGICAL OPERATION OR OTHER PROCEDURE    1. I hereby authorize Dr. Eliceo Pinzon and the [facility] staff assigned to my case to perform the following operation and/or procedure at the [facility]:    Left shoulder injection    2.   My phy Relationship to Patient:           []  Parent    Responsible person                          []  Spouse  In case of minor or                    [] Other  _____________   Incompetent name:  __________________________________________________

## (undated) NOTE — LETTER
Miriam Dub 35   Date:   2/28/2020     Name:   Arnie Claudio    YOB: 1965   MRN:   [id number]       WHERE IS YOUR PAIN NOW? Romel the areas on your body where you feel the described sensations.   Use the ap

## (undated) NOTE — ED AVS SNAPSHOT
Jeimy Finley   MRN: [id number]    Department:  [facility] Emergency Department   Date of Visit:  2/13/2018           Disclosure     Insurance plans vary and the physician(s) referred by the ER may not be covered by your plan.  Please within the next three months to obtain basic health screening including reassessment of your blood pressure.     IF THERE IS ANY CHANGE OR WORSENING OF YOUR CONDITION, CALL YOUR PRIMARY CARE PHYSICIAN AT ONCE OR RETURN IMMEDIATELY TO THE EMERGENCY DEPARTMEN

## (undated) NOTE — LETTER
Miriam Dub 35   Date:   9/23/2019     Name:   Steve Claudio    YOB: 1965   MRN:   [id number]       WHERE IS YOUR PAIN NOW? Romel the areas on your body where you feel the described sensations.   Use the appr

## (undated) NOTE — LETTER
AUTHORIZATION FOR SURGICAL OPERATION OR OTHER PROCEDURE    1.  I hereby authorize Dr. Brady Tatum, and [facility] staff assigned to my case to perform the following operation and/or procedure at the [facility]:    ____________________________Cort Patient Name:  ______________________________________________________  (please print)      Patient signature:  ___________________________________________________             Relationship to Patient:           []  Parent    Responsible person

## (undated) NOTE — MR AVS SNAPSHOT
[address] [address]  [phone]               Thank you for choosing us for your health care visit with Shania Delgado MD.  We are glad to serve you and happy to provide you with this s Assoc Dx:  Elevated liver enzymes [R74.8]                 Referral Details     Referred By    Referred To    Yasmani Mcnamara MD   [address] [place] [address]   Phone:  [phone]   Fax:  [phone]    Diagnoses:  Acute midline low back If you have questions, you can call [phone] to talk to our [facility] Staff. Remember, MyChart is NOT to be used for urgent needs. For medical emergencies, dial 911.            Visit [facility] online at  [email or web address]

## (undated) NOTE — MR AVS SNAPSHOT
[address] [address]  [phone]               Thank you for choosing us for your health care visit with Nurse.   We are glad to serve you and happy to provide you with this summary of your vis Now link in the [employer] [place]. Enter your [employer] Activation Code exactly as it appears below along with your Zip Code and Date of Birth to complete the sign-up process. If you do not sign up before the expiration date, you must request a new code.     Dinesh Benjamin

## (undated) NOTE — LETTER
Miriam Dub 35   Date:   10/25/2019     Name:   Robby Claudio    YOB: 1965   MRN:   [id number]       WHERE IS YOUR PAIN NOW? Romel the areas on your body where you feel the described sensations.   Use the conrado

## (undated) NOTE — MR AVS SNAPSHOT
[address] [address]  [phone]               Thank you for choosing us for your health care visit with Nicole Gallagher MD.  We are glad to serve you and happy to provide you with this s Instructions:   To schedule a test at any [facility], call Central Scheduling at [phone], Monday through Friday between 7:30am to 6pm and on Saturday between 8am and 1pm. Evening and weekend appointments for your exam are a Support Staff. Remember, MyChart is NOT to be used for urgent needs. For medical emergencies, dial 911.         Educational Information     Healthy Diet and Regular Exercise  [employer] for making healthy food choices  -   Enjoy

## (undated) NOTE — LETTER
Miriam Dub 35   Date:   11/25/2019     Name:   Terrie Claudio    YOB: 1965   MRN:   [id number]       WHERE IS YOUR PAIN NOW? Romel the areas on your body where you feel the described sensations.   Use the a

## (undated) NOTE — LETTER
[facility] SURGERY SCHEDULING FORM   [address] [place] [address]   [phone] (scheduling phone) [phone] (scheduling fax)     PATIENT INFORMATION   Last Name:      Dougie Adorno      First Name: Allergies: Patient has no known allergies.          Completed by:    Randolph Jones      Date:    11/6/2019

## (undated) NOTE — LETTER
September 11, 2018     Kortney Miller   [address]      Dear Marianne Huston:    Below are the results from your recent visit:  Vitamin D level is good. Continue same dosage.      Resulted Orders   VITAMIN D, 25-HYDROXY   Res

## (undated) NOTE — LETTER
Miriam Dub 35   Date:   9/9/2019     Name:   Cathie Pino    YOB: 1965   MRN:   [id number]       WHERE IS YOUR PAIN NOW? Romel the areas on your body where you feel the described sensations.   Use the approp

## (undated) NOTE — LETTER
[facility] SURGERY SCHEDULING FORM   [address] [facility]   [phone] (scheduling phone) [phone] (scheduling fax)     PATIENT INFORMATION   Last Name:      Diane Rosas      First Name: []  No or using our own   Allergies: Patient has no known allergies.          Completed by:    Renny Marin      Date:    12/4/2019

## (undated) NOTE — LETTER
AUTHORIZATION FOR SURGICAL OPERATION OR OTHER PROCEDURE    1.  I hereby authorize Dr. Obi Hoffmann, and [facility] staff assigned to my case to perform the following operation and/or procedure at the [facility]:    Cortisone injection of Left s Time:  ________ A. M.  P.M.        Patient Name:  ______________________________________________________  (please print)      Patient signature:  ___________________________________________________             Relationship to Patient:

## (undated) NOTE — ED AVS SNAPSHOT
Richy Kauffman   MRN: [id number]    Department:  [facility] Emergency Department   Date of Visit:  8/26/2019           Disclosure     Insurance plans vary and the physician(s) referred by the ER may not be covered by your plan.  Please within the next three months to obtain basic health screening including reassessment of your blood pressure.     IF THERE IS ANY CHANGE OR WORSENING OF YOUR CONDITION, CALL YOUR PRIMARY CARE PHYSICIAN AT ONCE OR RETURN IMMEDIATELY TO THE EMERGENCY DEPARTMEN

## (undated) NOTE — LETTER
[facility] SURGERY SCHEDULING FORM   [address] [place] [place] [address]   [phone] (scheduling phone) [phone] (scheduling fax)     PATIENT INFORMATION   Last Name:      Nohemi Garcia      First Name: Completed by:    Noreen Walker      Date:    11/5/2019

## (undated) NOTE — LETTER
12/13/2019              [facility]        [address] [address]         Please supply above patient of mine with a battery operated portable blood pressure machine for home use to monitor her blood pressure.

## (undated) NOTE — ED AVS SNAPSHOT
Paulo Claudio   MRN: [id number]    Department:  [facility] Emergency Department   Date of Visit:  1/18/2020           Disclosure     Insurance plans vary and the physician(s) referred by the ER may not be covered by your plan.  Ha within the next three months to obtain basic health screening including reassessment of your blood pressure.     IF THERE IS ANY CHANGE OR WORSENING OF YOUR CONDITION, CALL YOUR PRIMARY CARE PHYSICIAN AT ONCE OR RETURN IMMEDIATELY TO THE EMERGENCY DEPARTMEN